# Patient Record
Sex: MALE | Race: WHITE | NOT HISPANIC OR LATINO | Employment: FULL TIME | URBAN - METROPOLITAN AREA
[De-identification: names, ages, dates, MRNs, and addresses within clinical notes are randomized per-mention and may not be internally consistent; named-entity substitution may affect disease eponyms.]

---

## 2019-06-20 ENCOUNTER — OFFICE VISIT (OUTPATIENT)
Dept: URGENT CARE | Facility: CLINIC | Age: 29
End: 2019-06-20
Payer: COMMERCIAL

## 2019-06-20 VITALS
BODY MASS INDEX: 30.92 KG/M2 | HEART RATE: 104 BPM | DIASTOLIC BLOOD PRESSURE: 72 MMHG | OXYGEN SATURATION: 100 % | WEIGHT: 216 LBS | TEMPERATURE: 103.7 F | SYSTOLIC BLOOD PRESSURE: 112 MMHG | HEIGHT: 70 IN | RESPIRATION RATE: 16 BRPM

## 2019-06-20 DIAGNOSIS — B34.9 VIRAL INFECTION: Primary | ICD-10-CM

## 2019-06-20 DIAGNOSIS — B35.4 TINEA CORPORIS: ICD-10-CM

## 2019-06-20 LAB — SL AMB POCT GLUCOSE BLD: 108

## 2019-06-20 PROCEDURE — 82948 REAGENT STRIP/BLOOD GLUCOSE: CPT | Performed by: FAMILY MEDICINE

## 2019-06-20 PROCEDURE — 99203 OFFICE O/P NEW LOW 30 MIN: CPT | Performed by: FAMILY MEDICINE

## 2019-06-21 ENCOUNTER — TELEPHONE (OUTPATIENT)
Dept: URGENT CARE | Facility: CLINIC | Age: 29
End: 2019-06-21

## 2019-06-21 NOTE — TELEPHONE ENCOUNTER
Call from pt stating  RX is not covered will need  Prior auth  Call placed to pharmacy they  said if ordered separately  RX will be  Covered spoke with Dr Minerva Park  Who will send in new RX  Ordered separately  Call placed to pt  To let them know new RX will be sent in  Call sent to Dr Minerva Park  To write new RX  RX will need to go to [facility] [place] AND [facility]

## 2019-06-22 ENCOUNTER — TELEPHONE (OUTPATIENT)
Dept: URGENT CARE | Facility: CLINIC | Age: 29
End: 2019-06-22

## 2019-06-22 DIAGNOSIS — B35.4 TINEA CORPORIS: Primary | ICD-10-CM

## 2019-06-22 RX ORDER — NYSTATIN 100000 U/G
CREAM TOPICAL 2 TIMES DAILY
Qty: 30 G | Refills: 0 | Status: SHIPPED | OUTPATIENT
Start: 2019-06-22 | End: 2020-01-20 | Stop reason: ALTCHOICE

## 2019-06-22 RX ORDER — TRIAMCINOLONE ACETONIDE 1 MG/G
CREAM TOPICAL 2 TIMES DAILY
Qty: 30 G | Refills: 0 | Status: SHIPPED | OUTPATIENT
Start: 2019-06-22 | End: 2020-01-20 | Stop reason: ALTCHOICE

## 2019-06-22 NOTE — TELEPHONE ENCOUNTER
Please see call from yesterday never done by Dr Quinton Lehman pt  Is now very upset  RX needs to be called in today

## 2019-07-03 ENCOUNTER — OFFICE VISIT (OUTPATIENT)
Dept: FAMILY MEDICINE CLINIC | Facility: CLINIC | Age: 29
End: 2019-07-03
Payer: COMMERCIAL

## 2019-07-03 VITALS
BODY MASS INDEX: 32.74 KG/M2 | SYSTOLIC BLOOD PRESSURE: 124 MMHG | DIASTOLIC BLOOD PRESSURE: 84 MMHG | HEART RATE: 91 BPM | HEIGHT: 68 IN | TEMPERATURE: 98.2 F | RESPIRATION RATE: 14 BRPM | WEIGHT: 216 LBS | OXYGEN SATURATION: 98 %

## 2019-07-03 DIAGNOSIS — Z76.89 ESTABLISHING CARE WITH NEW DOCTOR, ENCOUNTER FOR: Primary | ICD-10-CM

## 2019-07-03 DIAGNOSIS — A69.20 ERYTHEMA MIGRANS (LYME DISEASE): ICD-10-CM

## 2019-07-03 DIAGNOSIS — A69.20 LYME DISEASE: ICD-10-CM

## 2019-07-03 DIAGNOSIS — R21 RASH: ICD-10-CM

## 2019-07-03 PROCEDURE — 99202 OFFICE O/P NEW SF 15 MIN: CPT | Performed by: FAMILY MEDICINE

## 2019-07-03 RX ORDER — DOXYCYCLINE HYCLATE 100 MG/1
100 CAPSULE ORAL EVERY 12 HOURS SCHEDULED
Qty: 60 CAPSULE | Refills: 0 | Status: SHIPPED | OUTPATIENT
Start: 2019-07-03 | End: 2019-08-02

## 2019-07-03 RX ORDER — OMEGA-3 FATTY ACIDS/FISH OIL 300-1000MG
CAPSULE ORAL
COMMUNITY
End: 2020-01-20 | Stop reason: ALTCHOICE

## 2019-07-03 NOTE — PROGRESS NOTES
Assessment/Plan:    No problem-specific Assessment & Plan notes found for this encounter  Diagnoses and all orders for this visit:    Establishing care with new doctor, encounter for    Rash    Erythema migrans (Lyme disease)  -     doxycycline hyclate (VIBRAMYCIN) 100 mg capsule; Take 1 capsule (100 mg total) by mouth every 12 (twelve) hours for 30 days    Lyme disease    Other orders  -     Ibuprofen (ADVIL) 200 MG CAPS; Take by mouth 6 per day as needed for fever          Patient Instructions   PLENTY OF FLUIDS  REST  ADVIL  MEDICATION AS DIRECTED    RV IF SYMPTOMS WORSEN OR PERSIST    CONSIDER CPX IN THE FUTURE      Return in about 3 months (around 10/3/2019) for Annual physical     Subjective:      Patient ID: Moise Walker is a 33 y o  male  Chief Complaint   Patient presents with    [facility]    Lymes     seen at [facility], Intermittent Temps, Rash-several, stiff neck, H/A, Fatigue    Rash     multiple on Back-chest-legs-butt-arms       PATIENT IS A NEW PATIENT TO THE PRACTICE    REVIEWED MEDICAL RECORD    PATIENT STATES HE HAD HA, CHILLS  DEVELOPED A RASH WHICH SEEMS TO HAVE MULTIPLIED  NO NVD      The following portions of the patient's history were reviewed and updated as appropriate: allergies, current medications, past family history, past medical history, past social history, past surgical history and problem list     Review of Systems   Constitutional: Positive for chills, fatigue and fever  HENT: Negative for sore throat  Eyes: Negative for discharge  Respiratory: Negative for cough and chest tightness  Cardiovascular: Negative for chest pain and palpitations  Gastrointestinal: Negative for abdominal pain, diarrhea, nausea and vomiting  Musculoskeletal: Positive for arthralgias and myalgias  Negative for gait problem  Skin: Positive for rash  Neurological: Negative for dizziness, weakness and headaches  Hematological: Negative for adenopathy  Psychiatric/Behavioral: The patient is not nervous/anxious  Current Outpatient Medications   Medication Sig Dispense Refill    Ibuprofen (ADVIL) 200 MG CAPS Take by mouth 6 per day as needed for fever      doxycycline hyclate (VIBRAMYCIN) 100 mg capsule Take 1 capsule (100 mg total) by mouth every 12 (twelve) hours for 30 days 60 capsule 0    nystatin (MYCOSTATIN) cream Apply topically 2 (two) times a day (Patient not taking: Reported on 7/3/2019) 30 g 0    nystatin-triamcinolone (MYCOLOG-II) cream Apply topically 3 (three) times a day (Patient not taking: Reported on 7/3/2019) 30 g 0    triamcinolone (KENALOG) 0 1 % cream Apply topically 2 (two) times a day (Patient not taking: Reported on 7/3/2019) 30 g 0     No current facility-administered medications for this visit  Objective:    /84 (BP Location: Left arm, Patient Position: Sitting, Cuff Size: Standard)   Pulse 91   Temp 98 2 °F (36 8 °C) (Temporal)   Resp 14   Ht 5' 8" (1 727 m)   Wt 98 kg (216 lb)   SpO2 98%   BMI 32 84 kg/m²        Physical Exam   Constitutional: He is oriented to person, place, and time  He appears well-developed and well-nourished  HENT:   Head: Normocephalic and atraumatic  Eyes: Pupils are equal, round, and reactive to light  Conjunctivae and EOM are normal  Right eye exhibits no discharge  Left eye exhibits no discharge  Neck: Neck supple  No JVD present  No thyromegaly present  Cardiovascular: Normal rate, regular rhythm and normal heart sounds  No murmur heard  Pulmonary/Chest: Effort normal and breath sounds normal  He has no wheezes  He has no rales  Abdominal: Soft  Bowel sounds are normal  He exhibits no mass  There is no hepatosplenomegaly  There is no tenderness  There is no rebound, no guarding and no CVA tenderness  Musculoskeletal: Normal range of motion  He exhibits no edema, tenderness or deformity  Lymphadenopathy:     He has no cervical adenopathy       He has no axillary adenopathy  Neurological: He is alert and oriented to person, place, and time  Skin: Skin is warm and dry  Rash noted  No erythema  MULTIPLE TARGET LESIONS CONSISTENT WITH SECONDARY LYME'S DISEASE   Psychiatric: He has a normal mood and affect   His behavior is normal  Judgment and thought content normal               Francisco Bolanos MD

## 2019-07-03 NOTE — PATIENT INSTRUCTIONS
PLENTY OF FLUIDS  REST  ADVIL  MEDICATION AS DIRECTED    RV IF SYMPTOMS WORSEN OR PERSIST    CONSIDER CPX IN THE FUTURE

## 2020-01-08 ENCOUNTER — TELEPHONE (OUTPATIENT)
Dept: FAMILY MEDICINE CLINIC | Facility: CLINIC | Age: 30
End: 2020-01-08

## 2020-01-09 NOTE — TELEPHONE ENCOUNTER
Pend CPX blood work - [facility]   1-13-20 - HIV also    Verona Nicolas

## 2020-01-12 PROBLEM — Z13.29 SCREENING FOR HYPOTHYROIDISM: Status: ACTIVE | Noted: 2020-01-12

## 2020-01-12 PROBLEM — Z13.6 SCREENING FOR HYPERTENSION: Status: ACTIVE | Noted: 2020-01-12

## 2020-01-12 PROBLEM — Z13.220 SCREENING FOR HYPERLIPIDEMIA: Status: ACTIVE | Noted: 2020-01-12

## 2020-01-12 PROBLEM — Z00.00 ROUTINE GENERAL MEDICAL EXAMINATION AT A HEALTH CARE FACILITY: Status: ACTIVE | Noted: 2020-01-12

## 2020-01-13 ENCOUNTER — CLINICAL SUPPORT (OUTPATIENT)
Dept: FAMILY MEDICINE CLINIC | Facility: CLINIC | Age: 30
End: 2020-01-13
Payer: COMMERCIAL

## 2020-01-13 DIAGNOSIS — Z13.220 SCREENING FOR HYPERLIPIDEMIA: ICD-10-CM

## 2020-01-13 DIAGNOSIS — Z00.00 ROUTINE GENERAL MEDICAL EXAMINATION AT A HEALTH CARE FACILITY: Primary | ICD-10-CM

## 2020-01-13 DIAGNOSIS — Z13.6 SCREENING FOR HYPERTENSION: ICD-10-CM

## 2020-01-13 DIAGNOSIS — Z13.29 SCREENING FOR HYPOTHYROIDISM: ICD-10-CM

## 2020-01-13 PROCEDURE — 36415 COLL VENOUS BLD VENIPUNCTURE: CPT | Performed by: FAMILY MEDICINE

## 2020-01-14 LAB
ALBUMIN SERPL-MCNC: 5.2 G/DL (ref 3.5–5.5)
ALBUMIN/GLOB SERPL: 2.6 {RATIO} (ref 1.2–2.2)
ALP SERPL-CCNC: 63 IU/L (ref 39–117)
ALT SERPL-CCNC: 54 IU/L (ref 0–44)
AST SERPL-CCNC: 27 IU/L (ref 0–40)
BASOPHILS # BLD AUTO: 0.1 X10E3/UL (ref 0–0.2)
BASOPHILS NFR BLD AUTO: 1 %
BILIRUB SERPL-MCNC: 0.5 MG/DL (ref 0–1.2)
BUN SERPL-MCNC: 12 MG/DL (ref 6–20)
BUN/CREAT SERPL: 12 (ref 9–20)
CALCIUM SERPL-MCNC: 10.1 MG/DL (ref 8.7–10.2)
CHLORIDE SERPL-SCNC: 100 MMOL/L (ref 96–106)
CHOLEST SERPL-MCNC: 212 MG/DL (ref 100–199)
CHOLEST/HDLC SERPL: 4 RATIO (ref 0–5)
CO2 SERPL-SCNC: 24 MMOL/L (ref 20–29)
CREAT SERPL-MCNC: 1.04 MG/DL (ref 0.76–1.27)
EOSINOPHIL # BLD AUTO: 0.3 X10E3/UL (ref 0–0.4)
EOSINOPHIL NFR BLD AUTO: 3 %
ERYTHROCYTE [DISTWIDTH] IN BLOOD BY AUTOMATED COUNT: 13.4 % (ref 11.6–15.4)
GLOBULIN SER-MCNC: 2 G/DL (ref 1.5–4.5)
GLUCOSE SERPL-MCNC: 88 MG/DL (ref 65–99)
HCT VFR BLD AUTO: 49.2 % (ref 37.5–51)
HDLC SERPL-MCNC: 53 MG/DL
HGB BLD-MCNC: 17.3 G/DL (ref 13–17.7)
IMM GRANULOCYTES # BLD: 0 X10E3/UL (ref 0–0.1)
IMM GRANULOCYTES NFR BLD: 0 %
LDLC SERPL CALC-MCNC: 125 MG/DL (ref 0–99)
LYMPHOCYTES # BLD AUTO: 2.8 X10E3/UL (ref 0.7–3.1)
LYMPHOCYTES NFR BLD AUTO: 37 %
MCH RBC QN AUTO: 31.5 PG (ref 26.6–33)
MCHC RBC AUTO-ENTMCNC: 35.2 G/DL (ref 31.5–35.7)
MCV RBC AUTO: 90 FL (ref 79–97)
MONOCYTES # BLD AUTO: 0.6 X10E3/UL (ref 0.1–0.9)
MONOCYTES NFR BLD AUTO: 8 %
NEUTROPHILS # BLD AUTO: 4 X10E3/UL (ref 1.4–7)
NEUTROPHILS NFR BLD AUTO: 51 %
PLATELET # BLD AUTO: 251 X10E3/UL (ref 150–450)
POTASSIUM SERPL-SCNC: 4.3 MMOL/L (ref 3.5–5.2)
PROT SERPL-MCNC: 7.2 G/DL (ref 6–8.5)
RBC # BLD AUTO: 5.5 X10E6/UL (ref 4.14–5.8)
SL AMB EGFR AFRICAN AMERICAN: 111 ML/MIN/1.73
SL AMB EGFR NON AFRICAN AMERICAN: 96 ML/MIN/1.73
SL AMB VLDL CHOLESTEROL CALC: 34 MG/DL (ref 5–40)
SODIUM SERPL-SCNC: 143 MMOL/L (ref 134–144)
TRIGL SERPL-MCNC: 169 MG/DL (ref 0–149)
TSH SERPL DL<=0.005 MIU/L-ACNC: 1.46 UIU/ML (ref 0.45–4.5)
WBC # BLD AUTO: 7.7 X10E3/UL (ref 3.4–10.8)

## 2020-01-20 ENCOUNTER — OFFICE VISIT (OUTPATIENT)
Dept: FAMILY MEDICINE CLINIC | Facility: CLINIC | Age: 30
End: 2020-01-20
Payer: COMMERCIAL

## 2020-01-20 VITALS
HEIGHT: 70 IN | DIASTOLIC BLOOD PRESSURE: 80 MMHG | TEMPERATURE: 97.5 F | RESPIRATION RATE: 14 BRPM | BODY MASS INDEX: 32.67 KG/M2 | HEART RATE: 73 BPM | OXYGEN SATURATION: 100 % | WEIGHT: 228.2 LBS | SYSTOLIC BLOOD PRESSURE: 110 MMHG

## 2020-01-20 DIAGNOSIS — Z13.220 SCREENING FOR HYPERLIPIDEMIA: ICD-10-CM

## 2020-01-20 DIAGNOSIS — Z13.29 SCREENING FOR HYPOTHYROIDISM: ICD-10-CM

## 2020-01-20 DIAGNOSIS — Z00.00 ROUTINE GENERAL MEDICAL EXAMINATION AT A HEALTH CARE FACILITY: Primary | ICD-10-CM

## 2020-01-20 DIAGNOSIS — Z13.6 SCREENING FOR HYPERTENSION: ICD-10-CM

## 2020-01-20 DIAGNOSIS — Z23 NEED FOR TDAP VACCINATION: ICD-10-CM

## 2020-01-20 PROBLEM — A69.20 ERYTHEMA MIGRANS (LYME DISEASE): Status: RESOLVED | Noted: 2019-07-03 | Resolved: 2020-01-20

## 2020-01-20 PROBLEM — R21 RASH: Status: RESOLVED | Noted: 2019-07-03 | Resolved: 2020-01-20

## 2020-01-20 PROBLEM — Z76.89 ESTABLISHING CARE WITH NEW DOCTOR, ENCOUNTER FOR: Status: RESOLVED | Noted: 2019-07-03 | Resolved: 2020-01-20

## 2020-01-20 PROCEDURE — 93000 ELECTROCARDIOGRAM COMPLETE: CPT | Performed by: FAMILY MEDICINE

## 2020-01-20 PROCEDURE — 90471 IMMUNIZATION ADMIN: CPT

## 2020-01-20 PROCEDURE — 99395 PREV VISIT EST AGE 18-39: CPT | Performed by: FAMILY MEDICINE

## 2020-01-20 PROCEDURE — 90715 TDAP VACCINE 7 YRS/> IM: CPT

## 2020-01-20 NOTE — LETTER
VY SHAHEEN    Recent Results (from the past 672 hour(s))   TSH, 3rd generation    Collection Time: 01/13/20 10:01 AM   Result Value Ref Range    TSH 1 460 0 450 - 4 500 uIU/mL   CBC and differential    Collection Time: 01/13/20 10:01 AM   Result Value Ref Range    White Blood Cell Count 7 7 3 4 - 10 8 x10E3/uL    Red Blood Cell Count 5 50 4  14 - 5 80 x10E6/uL    Hemoglobin 17 3 13 0 - 17 7 g/dL    HCT 49 2 37 5 - 51 0 %    MCV 90 79 - 97 fL    MCH 31 5 26 6 - 33 0 pg    MCHC 35 2 31 5 - 35 7 g/dL    RDW 13 4 11 6 - 15 4 %    Platelet Count 361 080 - 450 x10E3/uL    Neutrophils 51 Not Estab  %    Lymphocytes 37 Not Estab  %    Monocytes 8 Not Estab  %    Eosinophils 3 Not Estab  %    Basophils PCT 1 Not Estab  %    Neutrophils (Absolute) 4 0 1 4 - 7 0 x10E3/uL    Lymphocytes (Absolute) 2 8 0 7 - 3 1 x10E3/uL    Monocytes (Absolute) 0 6 0 1 - 0 9 x10E3/uL    Eosinophils (Absolute) 0 3 0 0 - 0 4 x10E3/uL    Basophils ABS 0 1 0 0 - 0 2 x10E3/uL    Immature Granulocytes 0 Not Estab  %    Immature Granulocytes (Absolute) 0 0 0 0 - 0 1 x10E3/uL   Comprehensive metabolic panel    Collection Time: 01/13/20 10:01 AM   Result Value Ref Range    Glucose, Random 88 65 - 99 mg/dL    BUN 12 6 - 20 mg/dL    Creatinine 1 04 0 76 - 1 27 mg/dL    eGFR Non  96 >59 mL/min/1 73    eGFR  111 >59 mL/min/1 73    SL AMB BUN/CREATININE RATIO 12 9 - 20    Sodium 143 134 - 144 mmol/L    Potassium 4 3 3 5 - 5 2 mmol/L    Chloride 100 96 - 106 mmol/L    CO2 24 20 - 29 mmol/L    CALCIUM 10 1 8 7 - 10 2 mg/dL    Protein, Total 7 2 6 0 - 8 5 g/dL    Albumin 5 2 3 5 - 5 5 g/dL    Globulin, Total 2 0 1 5 - 4 5 g/dL    Albumin/Globulin Ratio 2 6 (H) 1 2 - 2 2    TOTAL BILIRUBIN 0 5 0 0 - 1 2 mg/dL    Alk Phos Isoenzymes 63 39 - 117 IU/L    AST 27 0 - 40 IU/L    ALT 54 (H) 0 - 44 IU/L   Lipid panel    Collection Time: 01/13/20 10:01 AM   Result Value Ref Range    Cholesterol, Total 212 (H) 100 - 199 mg/dL Triglycerides 169 (H) 0 - 149 mg/dL    HDL 53 >39 mg/dL    VLDL Cholesterol Calculated 34 5 - 40 mg/dL    LDL Direct 125 (H) 0 - 99 mg/dL    T   Chol/HDL Ratio 4 0 0 0 - 5 0 ratio

## 2020-01-20 NOTE — PATIENT INSTRUCTIONS
DISCUSSED HEALTH MAINTENENCE ISSUES  BW WILL BE REVIEWED  ENCOURAGED HEALTHY DIET AND EXERCISE  RV FOR ANNUAL HEALTH EXAM IN 1 YEAR  RV SOONER IF THERE ARE ANY CONCERNS    Recent Results (from the past 672 hour(s))   TSH, 3rd generation    Collection Time: 01/13/20 10:01 AM   Result Value Ref Range    TSH 1 460 0 450 - 4 500 uIU/mL   CBC and differential    Collection Time: 01/13/20 10:01 AM   Result Value Ref Range    White Blood Cell Count 7 7 3 4 - 10 8 x10E3/uL    Red Blood Cell Count 5 50 4  14 - 5 80 x10E6/uL    Hemoglobin 17 3 13 0 - 17 7 g/dL    HCT 49 2 37 5 - 51 0 %    MCV 90 79 - 97 fL    MCH 31 5 26 6 - 33 0 pg    MCHC 35 2 31 5 - 35 7 g/dL    RDW 13 4 11 6 - 15 4 %    Platelet Count 883 417 - 450 x10E3/uL    Neutrophils 51 Not Estab  %    Lymphocytes 37 Not Estab  %    Monocytes 8 Not Estab  %    Eosinophils 3 Not Estab  %    Basophils PCT 1 Not Estab  %    Neutrophils (Absolute) 4 0 1 4 - 7 0 x10E3/uL    Lymphocytes (Absolute) 2 8 0 7 - 3 1 x10E3/uL    Monocytes (Absolute) 0 6 0 1 - 0 9 x10E3/uL    Eosinophils (Absolute) 0 3 0 0 - 0 4 x10E3/uL    Basophils ABS 0 1 0 0 - 0 2 x10E3/uL    Immature Granulocytes 0 Not Estab  %    Immature Granulocytes (Absolute) 0 0 0 0 - 0 1 x10E3/uL   Comprehensive metabolic panel    Collection Time: 01/13/20 10:01 AM   Result Value Ref Range    Glucose, Random 88 65 - 99 mg/dL    BUN 12 6 - 20 mg/dL    Creatinine 1 04 0 76 - 1 27 mg/dL    eGFR Non  96 >59 mL/min/1 73    eGFR  111 >59 mL/min/1 73    SL AMB BUN/CREATININE RATIO 12 9 - 20    Sodium 143 134 - 144 mmol/L    Potassium 4 3 3 5 - 5 2 mmol/L    Chloride 100 96 - 106 mmol/L    CO2 24 20 - 29 mmol/L    CALCIUM 10 1 8 7 - 10 2 mg/dL    Protein, Total 7 2 6 0 - 8 5 g/dL    Albumin 5 2 3 5 - 5 5 g/dL    Globulin, Total 2 0 1 5 - 4 5 g/dL    Albumin/Globulin Ratio 2 6 (H) 1 2 - 2 2    TOTAL BILIRUBIN 0 5 0 0 - 1 2 mg/dL    Alk Phos Isoenzymes 63 39 - 117 IU/L    AST 27 0 - 40 IU/L    ALT 54 (H) 0 - 44 IU/L   Lipid panel    Collection Time: 01/13/20 10:01 AM   Result Value Ref Range    Cholesterol, Total 212 (H) 100 - 199 mg/dL    Triglycerides 169 (H) 0 - 149 mg/dL    HDL 53 >39 mg/dL    VLDL Cholesterol Calculated 34 5 - 40 mg/dL    LDL Direct 125 (H) 0 - 99 mg/dL    T   Chol/HDL Ratio 4 0 0 0 - 5 0 ratio

## 2020-01-20 NOTE — PROGRESS NOTES
BMI Counseling: Body mass index is 33 22 kg/m²  The BMI is above normal  Nutrition recommendations include encouraging healthy choices of fruits and vegetables and moderation in carbohydrate intake  Exercise recommendations include exercising 3-5 times per week  No pharmacotherapy was ordered  FAMILY PRACTICE HEALTH MAINTENANCE OFFICE VISIT  [facility] - [facility] [address] [facility]    NAME: Dora Jha  AGE: 26 y o  SEX: male  : 1990     DATE: 2020    Assessment and Plan     Problem List Items Addressed This Visit        Other    Screening for hypothyroidism    Screening for hyperlipidemia    Screening for hypertension    Relevant Orders    POCT urine dip auto non-scope    POCT ECG (Completed)    Routine general medical examination at a health care facility - Primary    Relevant Orders    POCT urine dip auto non-scope    POCT ECG (Completed)    Need for Tdap vaccination    Relevant Orders    TDAP VACCINE GREATER THAN OR EQUAL TO 8YO IM               Return in about 1 year (around 2021) for Annual physical         Chief Complaint     Chief Complaint   Patient presents with    Annual Exam    Immunizations     would like to discuss Tdap       History of Present Illness     [address] RECORD  NO CONCERNS AT THIS TIME        Well Adult Physical   Patient here for a comprehensive physical exam       Diet and Physical Activity  Diet: well balanced diet  Weight concerns: Patient has class 1 obesity (BMI 30-34  9)  Exercise: infrequently      Depression Screen  PHQ-9 Depression Screening    PHQ-9:    Frequency of the following problems over the past two weeks:       Little interest or pleasure in doing things:  0 - not at all  Feeling down, depressed, or hopeless:  0 - not at all  PHQ-2 Score:  0          General Health  Hearing: Normal:  bilateral  Vision: no vision problems  Dental: regular dental visits    Reproductive Health          The following portions of the patient's history were reviewed and updated as appropriate: allergies, current medications, past family history, past medical history, past social history, past surgical history and problem list     Review of Systems     Review of Systems   Constitutional: Negative for chills, fatigue and fever  HENT: Negative for congestion, ear discharge, ear pain, mouth sores, postnasal drip, sore throat and trouble swallowing  Eyes: Negative for pain, discharge and visual disturbance  Respiratory: Negative for cough, shortness of breath and wheezing  Cardiovascular: Negative for chest pain, palpitations and leg swelling  Gastrointestinal: Negative for abdominal distention, abdominal pain, blood in stool, diarrhea and nausea  Endocrine: Negative for polydipsia, polyphagia and polyuria  Genitourinary: Negative for dysuria, frequency, hematuria and urgency  Musculoskeletal: Negative for arthralgias, gait problem and joint swelling  Skin: Negative for pallor and rash  Neurological: Negative for dizziness, syncope, speech difficulty, weakness, light-headedness, numbness and headaches  Hematological: Negative for adenopathy  Psychiatric/Behavioral: Negative for behavioral problems, confusion and sleep disturbance  The patient is not nervous/anxious          Past Medical History     Past Medical History:   Diagnosis Date    Erb's palsy since birth   Venkat Mehdi Hernia of abdominal wall 12/2018    Hypoglycemic disorder     Hypoglycemic disorder        Past Surgical History     Past Surgical History:   Procedure Laterality Date    HERNIA REPAIR         Social History     Social History     Socioeconomic History    Marital status: /Civil Union     Spouse name: None    Number of children: None    Years of education: None    Highest education level: None   Occupational History    None   Social Needs    Financial resource strain: None    Food insecurity:     Worry: None Inability: None    Transportation needs:     Medical: None     Non-medical: None   Tobacco Use    Smoking status: Former Smoker     Last attempt to quit: 2018     Years since quittin 1    Smokeless tobacco: Never Used   Substance and Sexual Activity    Alcohol use: Yes     Alcohol/week: 4 0 standard drinks     Types: 4 Cans of beer per week     Frequency: 2-4 times a month     Drinks per session: 3 or 4     Binge frequency: Less than monthly    Drug use: Yes     Types: Marijuana     Comment: OCCASSIONALY    Sexual activity: Yes     Partners: Female     Birth control/protection: None   Lifestyle    Physical activity:     Days per week: None     Minutes per session: None    Stress: None   Relationships    Social connections:     Talks on phone: None     Gets together: None     Attends [employer] service: None     Active member of club or organization: None     Attends meetings of clubs or organizations: None     Relationship status: None    Intimate partner violence:     Fear of current or ex partner: None     Emotionally abused: None     Physically abused: None     Forced sexual activity: None   Other Topics Concern    None   Social History Narrative    None       Family History     Family History   Problem Relation Age of Onset    Diabetes Mother     Hypertension Mother     No Known Problems Father     No Known Problems Sister     No Known Problems Brother     Substance Abuse Neg Hx     Mental illness Neg Hx        Current Medications     No current outpatient medications on file  Allergies     Allergies   Allergen Reactions    Penicillin G Benzathine Anaphylaxis    Other      LIVE VACCINES - WILL GET THE DZ       Objective     /80   Pulse 73   Temp 97 5 °F (36 4 °C) (Temporal)   Resp 14   Ht 5' 9 5" (1 765 m)   Wt 104 kg (228 lb 3 2 oz)   SpO2 100%   BMI 33 22 kg/m²      Physical Exam   Constitutional: He is oriented to person, place, and time   He appears well-developed and well-nourished  HENT:   Head: Normocephalic and atraumatic  Right Ear: External ear normal    Left Ear: External ear normal    Nose: Nose normal    Mouth/Throat: Oropharynx is clear and moist    Eyes: Pupils are equal, round, and reactive to light  Conjunctivae and EOM are normal  Right eye exhibits no discharge  Left eye exhibits no discharge  Neck: Neck supple  No JVD present  No thyromegaly present  Cardiovascular: Normal rate, regular rhythm, normal heart sounds and intact distal pulses  No murmur heard  Pulmonary/Chest: Effort normal and breath sounds normal  He has no wheezes  He has no rales  Abdominal: Soft  Bowel sounds are normal  He exhibits no mass  There is no hepatosplenomegaly  There is no tenderness  There is no rebound, no guarding and no CVA tenderness  Genitourinary: Penis normal    Musculoskeletal: Normal range of motion  He exhibits no edema, tenderness or deformity  Lymphadenopathy:     He has no cervical adenopathy  He has no axillary adenopathy  Neurological: He is alert and oriented to person, place, and time  He has normal reflexes  No cranial nerve deficit  He exhibits normal muscle tone  Coordination normal    Skin: Skin is warm and dry  No rash noted  No erythema  Psychiatric: He has a normal mood and affect   His behavior is normal  Judgment and thought content normal          No exam data present    Health Maintenance     Health Maintenance   Topic Date Due    DTaP,Tdap,and Td Vaccines (1 - Tdap) 11/29/2000    HIV Screening  11/29/2004    BMI: Followup Plan  11/29/2007    Annual Physical  11/29/2007    Influenza Vaccine  02/16/2020 (Originally 7/1/2019)    Depression Screening PHQ  07/03/2020    BMI: Adult  07/03/2020    Pneumococcal Vaccine: 65+ Years (1 of 2 - PCV13) 11/29/2054    Pneumococcal Vaccine: Pediatrics (0 to 5 Years) and At-Risk Patients (6 to 59 Years)  Aged Out    HIB Vaccine  Aged Out    Hepatitis B Vaccine  Aged Out    IPV Vaccine  Aged Out    Hepatitis A Vaccine  Aged Out    Meningococcal ACWY Vaccine  Aged Out    HPV Vaccine  Aged Out     There is no immunization history for the selected administration types on file for this patient      Kassandra Noriega MD  [facility]

## 2020-08-07 ENCOUNTER — OFFICE VISIT (OUTPATIENT)
Dept: URGENT CARE | Facility: CLINIC | Age: 30
End: 2020-08-07
Payer: COMMERCIAL

## 2020-08-07 VITALS
BODY MASS INDEX: 30.07 KG/M2 | HEIGHT: 71 IN | TEMPERATURE: 98.3 F | OXYGEN SATURATION: 100 % | RESPIRATION RATE: 18 BRPM | SYSTOLIC BLOOD PRESSURE: 126 MMHG | DIASTOLIC BLOOD PRESSURE: 72 MMHG | HEART RATE: 82 BPM | WEIGHT: 214.8 LBS

## 2020-08-07 DIAGNOSIS — H60.311 ACUTE DIFFUSE OTITIS EXTERNA OF RIGHT EAR: Primary | ICD-10-CM

## 2020-08-07 PROCEDURE — 1036F TOBACCO NON-USER: CPT | Performed by: FAMILY MEDICINE

## 2020-08-07 PROCEDURE — 99213 OFFICE O/P EST LOW 20 MIN: CPT | Performed by: FAMILY MEDICINE

## 2020-08-07 PROCEDURE — 3008F BODY MASS INDEX DOCD: CPT | Performed by: FAMILY MEDICINE

## 2020-08-07 RX ORDER — CIPROFLOXACIN AND DEXAMETHASONE 3; 1 MG/ML; MG/ML
4 SUSPENSION/ DROPS AURICULAR (OTIC) 2 TIMES DAILY
Qty: 7.5 ML | Refills: 0 | Status: SHIPPED | OUTPATIENT
Start: 2020-08-07 | End: 2020-08-14

## 2020-08-07 NOTE — PROGRESS NOTES
[facility]        NAME: Fanta Maria is a 26 y o  male  : 1990    MRN: [id number]  DATE: 2020  TIME: 3:41 PM    Assessment and Plan   Acute diffuse otitis externa of right ear [H60 311]  1  Acute diffuse otitis externa of right ear  ciprofloxacin-dexamethasone (CIPRODEX) otic suspension     Ear wick inserted into the right ear canal   Prescribed 1 week of Ciprodex  Patient Instructions     Follow up with PCP in 3-5 days  Proceed to  ER if symptoms worsen  Chief Complaint     Chief Complaint   Patient presents with    Earache     Pt here for  right ear pain  pt states has swimmers ear x 2 weeks not getting better used OTC meds not helping,  Pain 6/10 , no fever  History of Present Illness     23-year-old male presents today due to right otalgia which has persisted over the past 2 weeks  Does recall that it improved and then worsened over the past 2 days  Denies any dizziness or nasal symptoms outside of his seasonal allergies  Tried mineral oil ear drops to no avail  Denies any obvious fever, chills or any obvious sick contacts  Review of Systems   Review of Systems   Constitutional: Negative for chills and fever  HENT: Positive for ear pain and rhinorrhea  Negative for congestion and ear discharge  Respiratory: Negative for cough and shortness of breath  Cardiovascular: Negative for chest pain  Gastrointestinal: Negative for abdominal pain and nausea  Allergic/Immunologic: Positive for environmental allergies  Neurological: Negative for dizziness and headaches           Current Medications       Current Outpatient Medications:     ciprofloxacin-dexamethasone (CIPRODEX) otic suspension, Administer 4 drops to the right ear 2 (two) times a day for 7 days, Disp: 7 5 mL, Rfl: 0    Current Allergies     Allergies as of 2020 - Reviewed 2020   Allergen Reaction Noted    Other  2019    Penicillin g benzathine Anaphylaxis 2019 The following portions of the patient's history were reviewed and updated as appropriate: allergies, current medications, past family history, past medical history, past social history, past surgical history and problem list      Past Medical History:   Diagnosis Date    Erb's palsy since birth   Fazal Boyle Erb's palsy     Hernia of abdominal wall 12/2018    Hypoglycemic disorder     Hypoglycemic disorder        Past Surgical History:   Procedure Laterality Date    HERNIA REPAIR         Family History   Problem Relation Age of Onset    Diabetes Mother     Hypertension Mother     No Known Problems Father     No Known Problems Sister     No Known Problems Brother     Substance Abuse Neg Hx     Mental illness Neg Hx          Medications have been verified  Objective   /72 (BP Location: Right arm, Patient Position: Sitting, Cuff Size: Standard)   Pulse 82   Temp 98 3 °F (36 8 °C) (Tympanic)   Resp 18   Ht 5' 10 5" (1 791 m)   Wt 97 4 kg (214 lb 12 8 oz)   SpO2 100%   BMI 30 38 kg/m²        Physical Exam     Physical Exam  Vitals signs and nursing note reviewed  Constitutional:       General: He is not in acute distress  Appearance: Normal appearance  He is normal weight  He is not ill-appearing or toxic-appearing  HENT:      Head: Normocephalic and atraumatic  Right Ear: External ear normal  There is no impacted cerumen  Left Ear: Tympanic membrane, ear canal and external ear normal  There is no impacted cerumen  Ears:      Comments: Right ear canal swollen  Unable to visualize TM  Eyes:      General:         Right eye: No discharge  Left eye: No discharge  Conjunctiva/sclera: Conjunctivae normal    Pulmonary:      Effort: Pulmonary effort is normal  No respiratory distress  Breath sounds: No stridor  No wheezing or rhonchi  Skin:     General: Skin is warm  Findings: No erythema  Neurological:      General: No focal deficit present  Mental Status: He is alert and oriented to person, place, and time  Psychiatric:         Mood and Affect: Mood normal          Behavior: Behavior normal          Thought Content:  Thought content normal          Judgment: Judgment normal

## 2020-11-06 DIAGNOSIS — Z13.6 SCREENING FOR HYPERTENSION: ICD-10-CM

## 2020-11-06 DIAGNOSIS — Z00.00 ROUTINE GENERAL MEDICAL EXAMINATION AT A HEALTH CARE FACILITY: Primary | ICD-10-CM

## 2020-11-06 DIAGNOSIS — Z13.29 SCREENING FOR HYPOTHYROIDISM: ICD-10-CM

## 2020-11-06 DIAGNOSIS — Z11.4 SCREENING FOR HIV (HUMAN IMMUNODEFICIENCY VIRUS): ICD-10-CM

## 2020-11-06 DIAGNOSIS — Z13.220 SCREENING FOR HYPERLIPIDEMIA: ICD-10-CM

## 2020-12-02 ENCOUNTER — TELEMEDICINE (OUTPATIENT)
Dept: FAMILY MEDICINE CLINIC | Facility: CLINIC | Age: 30
End: 2020-12-02
Payer: COMMERCIAL

## 2020-12-02 VITALS — BODY MASS INDEX: 29.96 KG/M2 | WEIGHT: 214 LBS | TEMPERATURE: 99 F | HEIGHT: 71 IN

## 2020-12-02 DIAGNOSIS — R51.9 ACUTE NONINTRACTABLE HEADACHE, UNSPECIFIED HEADACHE TYPE: ICD-10-CM

## 2020-12-02 DIAGNOSIS — R05.9 COUGH: ICD-10-CM

## 2020-12-02 DIAGNOSIS — Z20.822 CLOSE EXPOSURE TO COVID-19 VIRUS: Primary | ICD-10-CM

## 2020-12-02 DIAGNOSIS — Z20.822 CLOSE EXPOSURE TO COVID-19 VIRUS: ICD-10-CM

## 2020-12-02 PROCEDURE — 99214 OFFICE O/P EST MOD 30 MIN: CPT | Performed by: NURSE PRACTITIONER

## 2020-12-02 PROCEDURE — U0003 INFECTIOUS AGENT DETECTION BY NUCLEIC ACID (DNA OR RNA); SEVERE ACUTE RESPIRATORY SYNDROME CORONAVIRUS 2 (SARS-COV-2) (CORONAVIRUS DISEASE [COVID-19]), AMPLIFIED PROBE TECHNIQUE, MAKING USE OF HIGH THROUGHPUT TECHNOLOGIES AS DESCRIBED BY CMS-2020-01-R: HCPCS | Performed by: NURSE PRACTITIONER

## 2020-12-02 PROCEDURE — 3008F BODY MASS INDEX DOCD: CPT | Performed by: NURSE PRACTITIONER

## 2020-12-02 PROCEDURE — 3725F SCREEN DEPRESSION PERFORMED: CPT | Performed by: NURSE PRACTITIONER

## 2020-12-02 RX ORDER — BIOTIN 1 MG
TABLET ORAL DAILY
COMMUNITY

## 2020-12-04 ENCOUNTER — TELEPHONE (OUTPATIENT)
Dept: FAMILY MEDICINE CLINIC | Facility: CLINIC | Age: 30
End: 2020-12-04

## 2020-12-04 ENCOUNTER — TELEMEDICINE (OUTPATIENT)
Dept: FAMILY MEDICINE CLINIC | Facility: CLINIC | Age: 30
End: 2020-12-04
Payer: COMMERCIAL

## 2020-12-04 VITALS — TEMPERATURE: 100.3 F

## 2020-12-04 DIAGNOSIS — U07.1 COVID-19 VIRUS INFECTION: Primary | ICD-10-CM

## 2020-12-04 DIAGNOSIS — Z87.891 HISTORY OF SMOKING: ICD-10-CM

## 2020-12-04 DIAGNOSIS — R07.89 CHEST TIGHTNESS: ICD-10-CM

## 2020-12-04 LAB — SARS-COV-2 RNA SPEC QL NAA+PROBE: DETECTED

## 2020-12-04 PROCEDURE — 1036F TOBACCO NON-USER: CPT | Performed by: NURSE PRACTITIONER

## 2020-12-04 PROCEDURE — 99213 OFFICE O/P EST LOW 20 MIN: CPT | Performed by: NURSE PRACTITIONER

## 2020-12-04 RX ORDER — ALBUTEROL SULFATE 90 UG/1
2 AEROSOL, METERED RESPIRATORY (INHALATION) EVERY 6 HOURS PRN
Qty: 1 INHALER | Refills: 5 | Status: SHIPPED | OUTPATIENT
Start: 2020-12-04

## 2020-12-04 NOTE — TELEPHONE ENCOUNTER
Please schedule follow for next week  I sent a message for his wife as well, you may double book them      Rae Hogan [address] [place] [address]

## 2020-12-04 NOTE — TELEPHONE ENCOUNTER
Spoke to Sunday and she does not want to schedule anything yet  She stated that right now she does want to pay co-pays  They feel ok right now and will call you next week if they are having any issues

## 2020-12-04 NOTE — TELEPHONE ENCOUNTER
Taylor left a message for Kenzie, for them to [facility] schedule a virtual visit with Marc Rowley next week

## 2021-02-08 LAB
ALBUMIN SERPL-MCNC: 4.7 G/DL (ref 3.6–5.1)
ALBUMIN/GLOB SERPL: 2 (CALC) (ref 1–2.5)
ALP SERPL-CCNC: 52 U/L (ref 36–130)
ALT SERPL-CCNC: 43 U/L (ref 9–46)
AST SERPL-CCNC: 21 U/L (ref 10–40)
BASOPHILS # BLD AUTO: 90 CELLS/UL (ref 0–200)
BASOPHILS NFR BLD AUTO: 1.4 %
BILIRUB SERPL-MCNC: 0.4 MG/DL (ref 0.2–1.2)
BUN SERPL-MCNC: 15 MG/DL (ref 7–25)
BUN/CREAT SERPL: NORMAL (CALC) (ref 6–22)
CALCIUM SERPL-MCNC: 9.6 MG/DL (ref 8.6–10.3)
CHLORIDE SERPL-SCNC: 107 MMOL/L (ref 98–110)
CHOLEST SERPL-MCNC: 222 MG/DL
CHOLEST/HDLC SERPL: 4.5 (CALC)
CO2 SERPL-SCNC: 29 MMOL/L (ref 20–32)
CREAT SERPL-MCNC: 0.86 MG/DL (ref 0.6–1.35)
EOSINOPHIL # BLD AUTO: 250 CELLS/UL (ref 15–500)
EOSINOPHIL NFR BLD AUTO: 3.9 %
ERYTHROCYTE [DISTWIDTH] IN BLOOD BY AUTOMATED COUNT: 12.7 % (ref 11–15)
GLOBULIN SER CALC-MCNC: 2.3 G/DL (CALC) (ref 1.9–3.7)
GLUCOSE SERPL-MCNC: 89 MG/DL (ref 65–99)
HCT VFR BLD AUTO: 48.5 % (ref 38.5–50)
HDLC SERPL-MCNC: 49 MG/DL
HGB BLD-MCNC: 16.7 G/DL (ref 13.2–17.1)
HIV 1+2 AB+HIV1 P24 AG SERPL QL IA: NORMAL
LDLC SERPL CALC-MCNC: 152 MG/DL (CALC)
LYMPHOCYTES # BLD AUTO: 2483 CELLS/UL (ref 850–3900)
LYMPHOCYTES NFR BLD AUTO: 38.8 %
MCH RBC QN AUTO: 31.5 PG (ref 27–33)
MCHC RBC AUTO-ENTMCNC: 34.4 G/DL (ref 32–36)
MCV RBC AUTO: 91.5 FL (ref 80–100)
MONOCYTES # BLD AUTO: 570 CELLS/UL (ref 200–950)
MONOCYTES NFR BLD AUTO: 8.9 %
NEUTROPHILS # BLD AUTO: 3008 CELLS/UL (ref 1500–7800)
NEUTROPHILS NFR BLD AUTO: 47 %
NONHDLC SERPL-MCNC: 173 MG/DL (CALC)
PLATELET # BLD AUTO: 249 THOUSAND/UL (ref 140–400)
PMV BLD REES-ECKER: 10.8 FL (ref 7.5–12.5)
POTASSIUM SERPL-SCNC: 4.4 MMOL/L (ref 3.5–5.3)
PROT SERPL-MCNC: 7 G/DL (ref 6.1–8.1)
RBC # BLD AUTO: 5.3 MILLION/UL (ref 4.2–5.8)
SL AMB EGFR AFRICAN AMERICAN: 134 ML/MIN/1.73M2
SL AMB EGFR NON AFRICAN AMERICAN: 116 ML/MIN/1.73M2
SODIUM SERPL-SCNC: 142 MMOL/L (ref 135–146)
TRIGL SERPL-MCNC: 99 MG/DL
TSH SERPL-ACNC: 1.09 MIU/L (ref 0.4–4.5)
WBC # BLD AUTO: 6.4 THOUSAND/UL (ref 3.8–10.8)

## 2021-02-12 ENCOUNTER — OFFICE VISIT (OUTPATIENT)
Dept: FAMILY MEDICINE CLINIC | Facility: CLINIC | Age: 31
End: 2021-02-12
Payer: COMMERCIAL

## 2021-02-12 VITALS
SYSTOLIC BLOOD PRESSURE: 124 MMHG | DIASTOLIC BLOOD PRESSURE: 80 MMHG | TEMPERATURE: 98.5 F | HEART RATE: 80 BPM | WEIGHT: 220 LBS | RESPIRATION RATE: 18 BRPM | BODY MASS INDEX: 31.5 KG/M2 | OXYGEN SATURATION: 98 % | HEIGHT: 70 IN

## 2021-02-12 DIAGNOSIS — Z00.00 ROUTINE GENERAL MEDICAL EXAMINATION AT A HEALTH CARE FACILITY: Primary | ICD-10-CM

## 2021-02-12 DIAGNOSIS — Z13.6 SCREENING FOR HYPERTENSION: ICD-10-CM

## 2021-02-12 DIAGNOSIS — Z13.220 SCREENING FOR HYPERLIPIDEMIA: ICD-10-CM

## 2021-02-12 DIAGNOSIS — Z13.29 SCREENING FOR HYPOTHYROIDISM: ICD-10-CM

## 2021-02-12 LAB
ECG INTERP DURING EX: NORMAL MS
SL AMB  POCT GLUCOSE, UA: 0
SL AMB LEUKOCYTE ESTERASE,UA: 0
SL AMB POCT BILIRUBIN,UA: 0
SL AMB POCT BLOOD,UA: 0
SL AMB POCT CLARITY,UA: CLEAR
SL AMB POCT COLOR,UA: YELLOW
SL AMB POCT KETONES,UA: 0
SL AMB POCT NITRITE,UA: 0
SL AMB POCT PH,UA: 6
SL AMB POCT SPECIFIC GRAVITY,UA: 1.02
SL AMB POCT URINE PROTEIN: 0
SL AMB POCT UROBILINOGEN: 0

## 2021-02-12 PROCEDURE — 81003 URINALYSIS AUTO W/O SCOPE: CPT | Performed by: FAMILY MEDICINE

## 2021-02-12 PROCEDURE — 3008F BODY MASS INDEX DOCD: CPT | Performed by: FAMILY MEDICINE

## 2021-02-12 PROCEDURE — 99395 PREV VISIT EST AGE 18-39: CPT | Performed by: FAMILY MEDICINE

## 2021-02-12 PROCEDURE — 93000 ELECTROCARDIOGRAM COMPLETE: CPT | Performed by: FAMILY MEDICINE

## 2021-02-12 PROCEDURE — 1036F TOBACCO NON-USER: CPT | Performed by: FAMILY MEDICINE

## 2021-02-12 NOTE — LETTER
[facility]      Current Outpatient Medications:     albuterol (PROVENTIL HFA,VENTOLIN HFA) 90 mcg/act inhaler, Inhale 2 puffs every 6 (six) hours as needed for wheezing or shortness of breath, Disp: 1 Inhaler, Rfl: 5    CALCIUM-MAGNESIUM-ZINC PO, Take by mouth daily, Disp: , Rfl:     Cholecalciferol (Vitamin D3) 25 MCG (1000 UT) CAPS, Take by mouth daily, Disp: , Rfl:     ciprofloxacin-dexamethasone (CIPRODEX) otic suspension, Administer 4 drops to the right ear 2 (two) times a day for 7 days (Patient not taking: Reported on 12/2/2020), Disp: 7 5 mL, Rfl: 0      Recent Results (from the past 672 hour(s))   Lipid panel    Collection Time: 02/06/21  9:29 AM   Result Value Ref Range    Total Cholesterol 222 (H) <200 mg/dL    HDL 49 > OR = 40 mg/dL    Triglycerides 99 <150 mg/dL    LDL Calculated 152 (H) mg/dL (calc)    Chol HDLC Ratio 4 5 <5 0 (calc)    Non-HDL Cholesterol 173 (H) <130 mg/dL (calc)   Human Immunodeficiency Virus 1/2 Antigen / Antibody ( Fourth Generation) with Reflex Testing    Collection Time: 02/06/21  9:29 AM   Result Value Ref Range    HIV AG/AB, 4th Gen NON-REACTIVE NON-REACTIVE   Comprehensive metabolic panel    Collection Time: 02/06/21  9:29 AM   Result Value Ref Range    Glucose, Random 89 65 - 99 mg/dL    BUN 15 7 - 25 mg/dL    Creatinine 0 86 0 60 - 1 35 mg/dL    eGFR Non  116 > OR = 60 mL/min/1 73m2    eGFR  134 > OR = 60 mL/min/1 73m2    SL AMB BUN/CREATININE RATIO NOT APPLICABLE 6 - 22 (calc)    Sodium 142 135 - 146 mmol/L    Potassium 4 4 3 5 - 5 3 mmol/L    Chloride 107 98 - 110 mmol/L    CO2 29 20 - 32 mmol/L    Calcium 9 6 8 6 - 10 3 mg/dL    Protein, Total 7 0 6 1 - 8 1 g/dL    Albumin 4 7 3 6 - 5 1 g/dL    Globulin 2 3 1 9 - 3 7 g/dL (calc)    Albumin/Globulin Ratio 2 0 1 0 - 2 5 (calc)    TOTAL BILIRUBIN 0 4 0 2 - 1 2 mg/dL    Alkaline Phosphatase 52 36 - 130 U/L    AST 21 10 - 40 U/L    ALT 43 9 - 46 U/L   CBC and differential    Collection Time: 02/06/21  9:29 AM   Result Value Ref Range    White Blood Cell Count 6 4 3 8 - 10 8 Thousand/uL    Red Blood Cell Count 5 30 4 20 - 5 80 Million/uL    Hemoglobin 16 7 13 2 - 17 1 g/dL    HCT 48 5 38 5 - 50 0 %    MCV 91 5 80 0 - 100 0 fL    MCH 31 5 27 0 - 33 0 pg    MCHC 34 4 32 0 - 36 0 g/dL    RDW 12 7 11 0 - 15 0 %    Platelet Count 366 715 - 400 Thousand/uL    SL AMB MPV 10 8 7 5 - 12 5 fL    Neutrophils (Absolute) 3,008 1,500 - 7,800 cells/uL    Lymphocytes (Absolute) 2,483 850 - 3,900 cells/uL    Monocytes (Absolute) 570 200 - 950 cells/uL    Eosinophils (Absolute) 250 15 - 500 cells/uL    Basophils ABS 90 0 - 200 cells/uL    Neutrophils 47 %    Lymphocytes 38 8 %    Monocytes 8 9 %    Eosinophils 3 9 %    Basophils PCT 1 4 %   TSH, 3rd generation    Collection Time: 02/06/21  9:29 AM   Result Value Ref Range    TSH 1 09 0 40 - 4 50 mIU/L

## 2021-02-12 NOTE — PATIENT INSTRUCTIONS
DISCUSSED HEALTH MAINTENENCE ISSUES  BW WILL BE REVIEWED  ENCOURAGED HEALTHY DIET AND EXERCISE    RV FOR ANNUAL HEALTH EXAM IN 1 YEAR  RV SOONER IF THERE ARE ANY CONCERNS    Recent Results (from the past 672 hour(s))   Lipid panel    Collection Time: 02/06/21  9:29 AM   Result Value Ref Range    Total Cholesterol 222 (H) <200 mg/dL    HDL 49 > OR = 40 mg/dL    Triglycerides 99 <150 mg/dL    LDL Calculated 152 (H) mg/dL (calc)    Chol HDLC Ratio 4 5 <5 0 (calc)    Non-HDL Cholesterol 173 (H) <130 mg/dL (calc)   Human Immunodeficiency Virus 1/2 Antigen / Antibody ( Fourth Generation) with Reflex Testing    Collection Time: 02/06/21  9:29 AM   Result Value Ref Range    HIV AG/AB, 4th Gen NON-REACTIVE NON-REACTIVE   Comprehensive metabolic panel    Collection Time: 02/06/21  9:29 AM   Result Value Ref Range    Glucose, Random 89 65 - 99 mg/dL    BUN 15 7 - 25 mg/dL    Creatinine 0 86 0 60 - 1 35 mg/dL    eGFR Non  116 > OR = 60 mL/min/1 73m2    eGFR  134 > OR = 60 mL/min/1 73m2    SL AMB BUN/CREATININE RATIO NOT APPLICABLE 6 - 22 (calc)    Sodium 142 135 - 146 mmol/L    Potassium 4 4 3 5 - 5 3 mmol/L    Chloride 107 98 - 110 mmol/L    CO2 29 20 - 32 mmol/L    Calcium 9 6 8 6 - 10 3 mg/dL    Protein, Total 7 0 6 1 - 8 1 g/dL    Albumin 4 7 3 6 - 5 1 g/dL    Globulin 2 3 1 9 - 3 7 g/dL (calc)    Albumin/Globulin Ratio 2 0 1 0 - 2 5 (calc)    TOTAL BILIRUBIN 0 4 0 2 - 1 2 mg/dL    Alkaline Phosphatase 52 36 - 130 U/L    AST 21 10 - 40 U/L    ALT 43 9 - 46 U/L   CBC and differential    Collection Time: 02/06/21  9:29 AM   Result Value Ref Range    White Blood Cell Count 6 4 3 8 - 10 8 Thousand/uL    Red Blood Cell Count 5 30 4 20 - 5 80 Million/uL    Hemoglobin 16 7 13 2 - 17 1 g/dL    HCT 48 5 38 5 - 50 0 %    MCV 91 5 80 0 - 100 0 fL    MCH 31 5 27 0 - 33 0 pg    MCHC 34 4 32 0 - 36 0 g/dL    RDW 12 7 11 0 - 15 0 %    Platelet Count 363 633 - 400 Thousand/uL    SL AMB MPV 10 8 7 5 - 12 5 fL Neutrophils (Absolute) 3,008 1,500 - 7,800 cells/uL    Lymphocytes (Absolute) 2,483 850 - 3,900 cells/uL    Monocytes (Absolute) 570 200 - 950 cells/uL    Eosinophils (Absolute) 250 15 - 500 cells/uL    Basophils ABS 90 0 - 200 cells/uL    Neutrophils 47 %    Lymphocytes 38 8 %    Monocytes 8 9 %    Eosinophils 3 9 %    Basophils PCT 1 4 %   TSH, 3rd generation    Collection Time: 02/06/21  9:29 AM   Result Value Ref Range    TSH 1 09 0 40 - 4 50 mIU/L

## 2021-02-12 NOTE — PROGRESS NOTES
BMI Counseling: There is no height or weight on file to calculate BMI  The BMI is above normal  Nutrition recommendations include encouraging healthy choices of fruits and vegetables and moderation in carbohydrate intake  Exercise recommendations include exercising 3-5 times per week  No pharmacotherapy was ordered  FAMILY PRACTICE HEALTH MAINTENANCE OFFICE VISIT  [facility] - [facility] [address] [facility] PHYS[facility]ANS    NAME: Alyse Hooks  AGE: 31 y o  SEX: male  : 1990     DATE: 2021    Assessment and Plan     Problem List Items Addressed This Visit        Other    Screening for hypothyroidism    Screening for hyperlipidemia    Screening for hypertension    Relevant Orders    POCT ECG    Routine general medical examination at a health care facility - Primary               Return in about 1 year (around 2022) for Annual physical         Chief Complaint   No chief complaint on file  History of Present Illness     DISCUSSED HEALTH ISSUES  REVIEWED MEDICAL RECORD  NO CONCERNS AT THIS TIME        Well Adult Physical   Patient here for a comprehensive physical exam       Diet and Physical Activity  Diet: well balanced diet  Weight concerns: Patient has class 1 obesity (BMI 30-34  9)  Exercise: infrequently      Depression Screen  PHQ-9 Depression Screening    PHQ-9:   Frequency of the following problems over the past two weeks:              General Health  Hearing: Normal:  bilateral  Vision: no vision problems  Dental: regular dental visits    Reproductive Health          The following portions of the patient's history were reviewed and updated as appropriate: allergies, current medications, past family history, past medical history, past social history, past surgical history and problem list     Review of Systems     Review of Systems   Constitutional: Negative for chills, fatigue and fever     HENT: Negative for congestion, ear discharge, ear pain, mouth sores, postnasal drip, sore throat and trouble swallowing  Eyes: Negative for pain, discharge and visual disturbance  Respiratory: Negative for cough, shortness of breath and wheezing  Cardiovascular: Negative for chest pain, palpitations and leg swelling  Gastrointestinal: Negative for abdominal distention, abdominal pain, blood in stool, diarrhea and nausea  Endocrine: Negative for polydipsia, polyphagia and polyuria  Genitourinary: Negative for dysuria, frequency, hematuria and urgency  Musculoskeletal: Negative for arthralgias, gait problem and joint swelling  Skin: Negative for pallor and rash  Neurological: Negative for dizziness, syncope, speech difficulty, weakness, light-headedness, numbness and headaches  Hematological: Negative for adenopathy  Psychiatric/Behavioral: Negative for behavioral problems, confusion and sleep disturbance  The patient is not nervous/anxious  Past Medical History     Past Medical History:   Diagnosis Date    Erb's palsy since birth   Marily Cedillo Erb's palsy     Hernia of abdominal wall 2018    Hypoglycemic disorder     Hypoglycemic disorder        Past Surgical History     Past Surgical History:   Procedure Laterality Date    HERNIA REPAIR         Social History     Social History     Socioeconomic History    Marital status: /Civil Union     Spouse name: None    Number of children: None    Years of education: None    Highest education level: None   Occupational History    None   Social Needs    Financial resource strain: None    Food insecurity     Worry: None     Inability: None    Transportation needs     Medical: None     Non-medical: None   Tobacco Use    Smoking status: Former Smoker     Packs/day: 0 50     Years: 6 00     Pack years: 3 00     Quit date: 2018     Years since quittin 2    Smokeless tobacco: Never Used   Substance and Sexual Activity    Alcohol use:  Yes     Alcohol/week: 4 0 standard drinks     Types: 4 Cans of beer per week Frequency: 2-4 times a month     Drinks per session: 3 or 4     Binge frequency: Less than monthly    Drug use: Yes     Types: Marijuana     Comment: OCCASSIONALY    Sexual activity: Yes     Partners: Female     Birth control/protection: None   Lifestyle    Physical activity     Days per week: None     Minutes per session: None    Stress: None   Relationships    Social connections     Talks on phone: None     Gets together: None     Attends [employer] service: None     Active member of club or organization: None     Attends meetings of clubs or organizations: None     Relationship status: None    Intimate partner violence     Fear of current or ex partner: None     Emotionally abused: None     Physically abused: None     Forced sexual activity: None   Other Topics Concern    None   Social History Narrative    None       Family History     Family History   Problem Relation Age of Onset    Diabetes Mother     Hypertension Mother     No Known Problems Father     No Known Problems Sister     No Known Problems Brother     Substance Abuse Neg Hx     Mental illness Neg Hx        Current Medications       Current Outpatient Medications:     albuterol (PROVENTIL HFA,VENTOLIN HFA) 90 mcg/act inhaler, Inhale 2 puffs every 6 (six) hours as needed for wheezing or shortness of breath, Disp: 1 Inhaler, Rfl: 5    CALCIUM-MAGNESIUM-ZINC PO, Take by mouth daily, Disp: , Rfl:     Cholecalciferol (Vitamin D3) 25 MCG (1000 UT) CAPS, Take by mouth daily, Disp: , Rfl:     ciprofloxacin-dexamethasone (CIPRODEX) otic suspension, Administer 4 drops to the right ear 2 (two) times a day for 7 days (Patient not taking: Reported on 12/2/2020), Disp: 7 5 mL, Rfl: 0     Allergies     Allergies   Allergen Reactions    Other      LIVE VACCINES - WILL GET THE DZ    Penicillin G Benzathine Anaphylaxis       Objective     There were no vitals taken for this visit       Physical Exam  Constitutional:       Appearance: He is well-developed  HENT:      Head: Normocephalic and atraumatic  Right Ear: External ear normal       Left Ear: External ear normal       Nose: Nose normal    Eyes:      General:         Right eye: No discharge  Left eye: No discharge  Conjunctiva/sclera: Conjunctivae normal       Pupils: Pupils are equal, round, and reactive to light  Neck:      Musculoskeletal: Neck supple  Thyroid: No thyromegaly  Vascular: No JVD  Cardiovascular:      Rate and Rhythm: Normal rate and regular rhythm  Heart sounds: Normal heart sounds  No murmur  Pulmonary:      Effort: Pulmonary effort is normal       Breath sounds: Normal breath sounds  No wheezing or rales  Abdominal:      General: Bowel sounds are normal       Palpations: Abdomen is soft  There is no mass  Tenderness: There is no abdominal tenderness  There is no guarding or rebound  Genitourinary:     Penis: Normal     Musculoskeletal: Normal range of motion  General: No tenderness or deformity  Lymphadenopathy:      Cervical: No cervical adenopathy  Skin:     General: Skin is warm and dry  Findings: No erythema or rash  Neurological:      Mental Status: He is alert and oriented to person, place, and time  Cranial Nerves: No cranial nerve deficit  Motor: No abnormal muscle tone  Coordination: Coordination normal       Deep Tendon Reflexes: Reflexes are normal and symmetric  Psychiatric:         Behavior: Behavior normal          Thought Content:  Thought content normal          Judgment: Judgment normal            No exam data present    Health Maintenance     Health Maintenance   Topic Date Due    BMI: Followup Plan  01/20/2021    Annual Physical  01/20/2021    Influenza Vaccine (1) 06/30/2021 (Originally 9/1/2020)    Depression Screening PHQ  12/02/2021    BMI: Adult  12/02/2021    DTaP,Tdap,and Td Vaccines (2 - Td) 01/20/2030    HIV Screening  Completed    Pneumococcal Vaccine: Pediatrics (0 to 5 Years) and At-Risk Patients (6 to 59 Years)  Aged Out    HIB Vaccine  Aged Out    Hepatitis B Vaccine  Aged Out    IPV Vaccine  Aged Out    Hepatitis A Vaccine  Aged Out    Meningococcal ACWY Vaccine  Aged Out    HPV Vaccine  Aged [employer] History   Administered Date(s) Administered    Tdap 01/20/2020       Sally Cross MD  [facility]

## 2023-12-28 ENCOUNTER — TELEPHONE (OUTPATIENT)
Age: 33
End: 2023-12-28

## 2023-12-28 DIAGNOSIS — Z00.00 ROUTINE GENERAL MEDICAL EXAMINATION AT A HEALTH CARE FACILITY: ICD-10-CM

## 2023-12-28 DIAGNOSIS — Z13.6 SCREENING FOR HYPERTENSION: ICD-10-CM

## 2023-12-28 DIAGNOSIS — Z13.29 SCREENING FOR HYPOTHYROIDISM: Primary | ICD-10-CM

## 2023-12-28 DIAGNOSIS — Z11.59 NEED FOR HEPATITIS C SCREENING TEST: ICD-10-CM

## 2023-12-28 DIAGNOSIS — Z13.220 SCREENING FOR HYPERLIPIDEMIA: ICD-10-CM

## 2023-12-28 NOTE — TELEPHONE ENCOUNTER
Result note changed into a Telephone Encounter.  Left message on machine patient to return call and notify that lab. Work is ready to be done before appointment.  This call will be handled by the [facility] when patient returns the call.  Alpesh Garcia LPN

## 2023-12-28 NOTE — TELEPHONE ENCOUNTER
Pt wife called requesting lab work for patients appt. Patient is scheduled for  1/22/23. Wife is requesting blood work before his apt. Would like a call back to patient once the lab work has been placed. Thank you!

## 2024-02-21 PROBLEM — Z13.220 SCREENING FOR HYPERLIPIDEMIA: Status: RESOLVED | Noted: 2020-01-12 | Resolved: 2024-02-21

## 2024-02-21 PROBLEM — Z13.6 SCREENING FOR HYPERTENSION: Status: RESOLVED | Noted: 2020-01-12 | Resolved: 2024-02-21

## 2024-02-21 PROBLEM — Z13.29 SCREENING FOR HYPOTHYROIDISM: Status: RESOLVED | Noted: 2020-01-12 | Resolved: 2024-02-21

## 2024-02-21 PROBLEM — Z00.00 ROUTINE GENERAL MEDICAL EXAMINATION AT A HEALTH CARE FACILITY: Status: RESOLVED | Noted: 2020-01-12 | Resolved: 2024-02-21

## 2024-02-21 LAB
BASOPHILS # BLD AUTO: 0.1 X10E3/UL (ref 0–0.2)
BASOPHILS NFR BLD AUTO: 1 %
EOSINOPHIL # BLD AUTO: 0.3 X10E3/UL (ref 0–0.4)
EOSINOPHIL NFR BLD AUTO: 3 %
ERYTHROCYTE [DISTWIDTH] IN BLOOD BY AUTOMATED COUNT: 12.6 % (ref 11.6–15.4)
HCT VFR BLD AUTO: 51.7 % (ref 37.5–51)
HGB BLD-MCNC: 17 G/DL (ref 13–17.7)
IMM GRANULOCYTES # BLD: 0 X10E3/UL (ref 0–0.1)
IMM GRANULOCYTES NFR BLD: 0 %
LYMPHOCYTES # BLD AUTO: 3 X10E3/UL (ref 0.7–3.1)
LYMPHOCYTES NFR BLD AUTO: 36 %
MCH RBC QN AUTO: 30.6 PG (ref 26.6–33)
MCHC RBC AUTO-ENTMCNC: 32.9 G/DL (ref 31.5–35.7)
MCV RBC AUTO: 93 FL (ref 79–97)
MONOCYTES # BLD AUTO: 0.7 X10E3/UL (ref 0.1–0.9)
MONOCYTES NFR BLD AUTO: 8 %
NEUTROPHILS # BLD AUTO: 4.2 X10E3/UL (ref 1.4–7)
NEUTROPHILS NFR BLD AUTO: 52 %
PLATELET # BLD AUTO: 253 X10E3/UL (ref 150–450)
RBC # BLD AUTO: 5.56 X10E6/UL (ref 4.14–5.8)
WBC # BLD AUTO: 8.2 X10E3/UL (ref 3.4–10.8)

## 2024-02-22 LAB
ALBUMIN SERPL-MCNC: 4.8 G/DL (ref 4.1–5.1)
ALBUMIN/GLOB SERPL: 2.1 {RATIO} (ref 1.2–2.2)
ALP SERPL-CCNC: 66 IU/L (ref 44–121)
ALT SERPL-CCNC: 44 IU/L (ref 0–44)
AST SERPL-CCNC: 23 IU/L (ref 0–40)
BILIRUB SERPL-MCNC: 0.5 MG/DL (ref 0–1.2)
BUN SERPL-MCNC: 13 MG/DL (ref 6–20)
BUN/CREAT SERPL: 13 (ref 9–20)
CALCIUM SERPL-MCNC: 9.5 MG/DL (ref 8.7–10.2)
CHLORIDE SERPL-SCNC: 101 MMOL/L (ref 96–106)
CHOLEST SERPL-MCNC: 216 MG/DL (ref 100–199)
CHOLEST/HDLC SERPL: 3.9 RATIO (ref 0–5)
CO2 SERPL-SCNC: 25 MMOL/L (ref 20–29)
CREAT SERPL-MCNC: 1.01 MG/DL (ref 0.76–1.27)
EGFR: 100 ML/MIN/1.73
GLOBULIN SER-MCNC: 2.3 G/DL (ref 1.5–4.5)
GLUCOSE SERPL-MCNC: 84 MG/DL (ref 70–99)
HCV AB S/CO SERPL IA: NON REACTIVE
HDLC SERPL-MCNC: 55 MG/DL
LDLC SERPL CALC-MCNC: 144 MG/DL (ref 0–99)
POTASSIUM SERPL-SCNC: 4.6 MMOL/L (ref 3.5–5.2)
PROT SERPL-MCNC: 7.1 G/DL (ref 6–8.5)
SL AMB VLDL CHOLESTEROL CALC: 17 MG/DL (ref 5–40)
SODIUM SERPL-SCNC: 140 MMOL/L (ref 134–144)
TRIGL SERPL-MCNC: 93 MG/DL (ref 0–149)
TSH SERPL DL<=0.005 MIU/L-ACNC: 1.5 UIU/ML (ref 0.45–4.5)

## 2024-02-26 ENCOUNTER — OFFICE VISIT (OUTPATIENT)
Dept: FAMILY MEDICINE CLINIC | Facility: CLINIC | Age: 34
End: 2024-02-26
Payer: COMMERCIAL

## 2024-02-26 VITALS
OXYGEN SATURATION: 97 % | SYSTOLIC BLOOD PRESSURE: 122 MMHG | HEIGHT: 69 IN | TEMPERATURE: 97.6 F | RESPIRATION RATE: 14 BRPM | HEART RATE: 60 BPM | DIASTOLIC BLOOD PRESSURE: 82 MMHG | BODY MASS INDEX: 31.7 KG/M2 | WEIGHT: 214 LBS

## 2024-02-26 DIAGNOSIS — Z13.29 SCREENING FOR HYPOTHYROIDISM: ICD-10-CM

## 2024-02-26 DIAGNOSIS — Z13.220 SCREENING FOR HYPERLIPIDEMIA: ICD-10-CM

## 2024-02-26 DIAGNOSIS — Z00.00 ROUTINE GENERAL MEDICAL EXAMINATION AT A HEALTH CARE FACILITY: Primary | ICD-10-CM

## 2024-02-26 DIAGNOSIS — B07.9 VIRAL WARTS, UNSPECIFIED TYPE: ICD-10-CM

## 2024-02-26 DIAGNOSIS — Z13.6 SCREENING FOR HYPERTENSION: ICD-10-CM

## 2024-02-26 PROCEDURE — 99395 PREV VISIT EST AGE 18-39: CPT | Performed by: FAMILY MEDICINE

## 2024-02-26 PROCEDURE — 17000 DESTRUCT PREMALG LESION: CPT | Performed by: FAMILY MEDICINE

## 2024-02-26 NOTE — PROGRESS NOTES
Depression Screening and Follow-up Plan: Patient was screened for depression during today's encounter. They screened negative with a PHQ-2 score of 0.    Tobacco Cessation Counseling: Tobacco cessation counseling was provided. The patient is sincerely urged to quit consumption of tobacco. He is not ready to quit tobacco.     FAMILY PRACTICE HEALTH MAINTENANCE OFFICE VISIT  [facility] - [facility] PHYSICIANS    NAME: Warren Dai  AGE: 33 y.o. SEX: male  : 1990     DATE: 2024    Assessment and Plan     Problem List Items Addressed This Visit    None  Visit Diagnoses     Routine general medical examination at a health care facility    -  Primary    Screening for hypertension        Screening for hyperlipidemia        Screening for hypothyroidism        Viral warts, unspecified type                   Return in about 1 year (around 2025) for Annual physical.        Chief Complaint     Chief Complaint   Patient presents with   • Annual Exam       History of Present Illness     DISCUSSED HEALTH ISSUES  REVIEWED MEDICAL RECORD  CONCERNS AT THIS TIME    WART        Well Adult Physical   Patient here for a comprehensive physical exam.      Diet and Physical Activity  Diet: well balanced diet  Weight concerns: Patient has class 1 obesity (BMI 30-34.9)  Exercise: infrequently      Depression Screen  PHQ-2/9 Depression Screening    Little interest or pleasure in doing things: 0 - not at all  Feeling down, depressed, or hopeless: 0 - not at all  PHQ-2 Score: 0  PHQ-2 Interpretation: Negative depression screen          General Health  Hearing: Normal:  bilateral  Vision: no vision problems  Dental: regular dental visits    Reproductive Health          The following portions of the patient's history were reviewed and updated as appropriate: allergies, current medications, past family history, past medical history, past social history, past surgical history and problem  list.    Review of Systems     Review of Systems   Constitutional:  Negative for chills, fatigue and fever.   HENT:  Negative for congestion, ear discharge, ear pain, mouth sores, postnasal drip, sore throat and trouble swallowing.    Eyes:  Negative for pain, discharge and visual disturbance.   Respiratory:  Negative for cough, shortness of breath and wheezing.    Cardiovascular:  Negative for chest pain, palpitations and leg swelling.   Gastrointestinal:  Negative for abdominal distention, abdominal pain, blood in stool, diarrhea and nausea.   Endocrine: Negative for polydipsia, polyphagia and polyuria.   Genitourinary:  Negative for dysuria, frequency, hematuria and urgency.   Musculoskeletal:  Negative for arthralgias, gait problem and joint swelling.   Skin:  Negative for pallor and rash.   Neurological:  Negative for dizziness, syncope, speech difficulty, weakness, light-headedness, numbness and headaches.   Hematological:  Negative for adenopathy.   Psychiatric/Behavioral:  Negative for behavioral problems, confusion and sleep disturbance. The patient is not nervous/anxious.        Past Medical History     Past Medical History:   Diagnosis Date   • Erb's palsy since birth   • Erb's palsy    • Hernia of abdominal wall 2018   • Hypoglycemic disorder    • Hypoglycemic disorder        Past Surgical History     Past Surgical History:   Procedure Laterality Date   • HERNIA REPAIR         Social History     Social History     Socioeconomic History   • Marital status: /Civil Union     Spouse name: None   • Number of children: None   • Years of education: None   • Highest education level: None   Occupational History   • None   Tobacco Use   • Smoking status: Some Days     Current packs/day: 0.00     Average packs/day: 0.5 packs/day for 6.0 years (3.0 ttl pk-yrs)     Types: Cigarettes     Start date: 2012     Last attempt to quit: 2018     Years since quittin.2   • Smokeless tobacco: Never   Vaping  "Use   • Vaping status: Never Used   Substance and Sexual Activity   • Alcohol use: Yes     Alcohol/week: 4.0 standard drinks of alcohol     Types: 4 Cans of beer per week   • Drug use: Yes     Types: Marijuana     Comment: OCCASSIONALY   • Sexual activity: Yes     Partners: Female     Birth control/protection: None   Other Topics Concern   • None   Social History Narrative   • None     Social Determinants of Health     Financial Resource Strain: Not on file   Food Insecurity: Not on file   Transportation Needs: Not on file   Physical Activity: Not on file   Stress: Not on file   Social Connections: Not on file   Intimate Partner Violence: Not on file   Housing Stability: Not on file       Family History     Family History   Problem Relation Age of Onset   • Diabetes Mother    • Hypertension Mother    • No Known Problems Father    • No Known Problems Sister    • No Known Problems Brother    • Substance Abuse Neg Hx    • Mental illness Neg Hx        Current Medications       Current Outpatient Medications:   •  b complex vitamins tablet, Take 1 tablet by mouth daily, Disp: , Rfl:   •  CALCIUM-MAGNESIUM-ZINC PO, Take by mouth daily, Disp: , Rfl:   •  Cholecalciferol (Vitamin D3) 25 MCG (1000 UT) CAPS, Take by mouth daily (Patient not taking: Reported on 2/26/2024), Disp: , Rfl:      Allergies     Allergies   Allergen Reactions   • Other      LIVE VACCINES - WILL GET THE DZ   • Penicillin G Benzathine Anaphylaxis       Objective     /82 (BP Location: Left arm, Patient Position: Sitting, Cuff Size: Large)   Pulse 60   Temp 97.6 °F (36.4 °C) (Temporal)   Resp 14   Ht 5' 8.5\" (1.74 m)   Wt 97.1 kg (214 lb)   SpO2 97%   BMI 32.07 kg/m²      Physical Exam  Constitutional:       General: He is not in acute distress.     Appearance: Normal appearance. He is well-developed. He is not ill-appearing.   HENT:      Head: Normocephalic and atraumatic.      Right Ear: Tympanic membrane and external ear normal.      Left " Ear: Tympanic membrane and external ear normal.      Nose: Nose normal.      Mouth/Throat:      Mouth: Mucous membranes are moist.   Eyes:      General:         Right eye: No discharge.         Left eye: No discharge.      Conjunctiva/sclera: Conjunctivae normal.      Pupils: Pupils are equal, round, and reactive to light.   Neck:      Thyroid: No thyromegaly.      Vascular: No JVD.   Cardiovascular:      Rate and Rhythm: Normal rate and regular rhythm.      Heart sounds: Normal heart sounds. No murmur heard.  Pulmonary:      Effort: Pulmonary effort is normal.      Breath sounds: Normal breath sounds. No wheezing or rales.   Abdominal:      General: Bowel sounds are normal.      Palpations: Abdomen is soft. There is no mass.      Tenderness: There is no abdominal tenderness. There is no guarding or rebound.   Genitourinary:     Penis: Normal.       Testes: Normal.   Musculoskeletal:         General: No tenderness or deformity. Normal range of motion.      Cervical back: Neck supple.   Lymphadenopathy:      Cervical: No cervical adenopathy.   Skin:     General: Skin is warm and dry.      Findings: Lesion present. No erythema or rash.      Comments: SMALL MOLLUSCUM LIKE WART R FOREARM   Neurological:      General: No focal deficit present.      Mental Status: He is alert and oriented to person, place, and time.      Cranial Nerves: No cranial nerve deficit.      Sensory: No sensory deficit.      Motor: No weakness or abnormal muscle tone.      Coordination: Coordination normal.      Gait: Gait normal.      Deep Tendon Reflexes: Reflexes are normal and symmetric. Reflexes normal.   Psychiatric:         Mood and Affect: Mood normal.         Behavior: Behavior normal.         Thought Content: Thought content normal.         Judgment: Judgment normal.           No results found.    Health Maintenance     Health Maintenance   Topic Date Due   • COVID-19 Vaccine (1 - 2023-24 season) Never done   • Influenza Vaccine (1)  06/30/2024 (Originally 9/1/2023)   • Depression Screening  02/26/2025   • Annual Physical  02/26/2025   • DTaP,Tdap,and Td Vaccines (2 - Td or Tdap) 01/20/2030   • Zoster Vaccine (1 of 2) 11/29/2039   • HIV Screening  Completed   • Hepatitis C Screening  Completed   • Pneumococcal Vaccine: Pediatrics (0 to 5 Years) and At-Risk Patients (6 to 64 Years)  Aged Out   • HIB Vaccine  Aged Out   • IPV Vaccine  Aged Out   • Hepatitis A Vaccine  Aged Out   • Meningococcal ACWY Vaccine  Aged Out   • HPV Vaccine  Aged Out     Immunization History   Administered Date(s) Administered   • Tdap 01/20/2020       Osiel Coles MD  [facility]

## 2024-02-26 NOTE — PATIENT INSTRUCTIONS
DISCUSSED HEALTH MAINTENENCE ISSUES  BW WILL BE REVIEWED  ENCOURAGED HEALTHY DIET AND EXERCISE    RV FOR ANNUAL HEALTH EXAM IN 1 YEAR  RV SOONER IF THERE ARE ANY CONCERNS      Recent Results (from the past 672 hour(s))   CBC and differential    Collection Time: 02/21/24  9:16 AM   Result Value Ref Range    White Blood Cell Count 8.2 3.4 - 10.8 x10E3/uL    Red Blood Cell Count 5.56 4.14 - 5.80 x10E6/uL    Hemoglobin 17.0 13.0 - 17.7 g/dL    HCT 51.7 (H) 37.5 - 51.0 %    MCV 93 79 - 97 fL    MCH 30.6 26.6 - 33.0 pg    MCHC 32.9 31.5 - 35.7 g/dL    RDW 12.6 11.6 - 15.4 %    Platelet Count 253 150 - 450 x10E3/uL    Neutrophils 52 Not Estab. %    Lymphocytes 36 Not Estab. %    Monocytes 8 Not Estab. %    Eosinophils 3 Not Estab. %    Basophils PCT 1 Not Estab. %    Neutrophils (Absolute) 4.2 1.4 - 7.0 x10E3/uL    Lymphocytes (Absolute) 3.0 0.7 - 3.1 x10E3/uL    Monocytes (Absolute) 0.7 0.1 - 0.9 x10E3/uL    Eosinophils (Absolute) 0.3 0.0 - 0.4 x10E3/uL    Basophils ABS 0.1 0.0 - 0.2 x10E3/uL    Immature Granulocytes 0 Not Estab. %    Immature Granulocytes (Absolute) 0.0 0.0 - 0.1 x10E3/uL   Comprehensive metabolic panel    Collection Time: 02/21/24  9:16 AM   Result Value Ref Range    Glucose, Random 84 70 - 99 mg/dL    BUN 13 6 - 20 mg/dL    Creatinine 1.01 0.76 - 1.27 mg/dL    eGFR 100 >59 mL/min/1.73    SL AMB BUN/CREATININE RATIO 13 9 - 20    Sodium 140 134 - 144 mmol/L    Potassium 4.6 3.5 - 5.2 mmol/L    Chloride 101 96 - 106 mmol/L    CO2 25 20 - 29 mmol/L    CALCIUM 9.5 8.7 - 10.2 mg/dL    Protein, Total 7.1 6.0 - 8.5 g/dL    Albumin 4.8 4.1 - 5.1 g/dL    Globulin, Total 2.3 1.5 - 4.5 g/dL    Albumin/Globulin Ratio 2.1 1.2 - 2.2    TOTAL BILIRUBIN 0.5 0.0 - 1.2 mg/dL    Alk Phos Isoenzymes 66 44 - 121 IU/L    AST 23 0 - 40 IU/L    ALT 44 0 - 44 IU/L   Hepatitis C antibody    Collection Time: 02/21/24  9:16 AM   Result Value Ref Range    HEP C AB Non Reactive Non Reactive   Lipid panel    Collection Time: 02/21/24   9:16 AM   Result Value Ref Range    Cholesterol, Total 216 (H) 100 - 199 mg/dL    Triglycerides 93 0 - 149 mg/dL    HDL 55 >39 mg/dL    VLDL Cholesterol Calculated 17 5 - 40 mg/dL    LDL Calculated 144 (H) 0 - 99 mg/dL    T. Chol/HDL Ratio 3.9 0.0 - 5.0 ratio   TSH, 3rd generation    Collection Time: 02/21/24  9:16 AM   Result Value Ref Range    TSH 1.500 0.450 - 4.500 uIU/mL

## 2024-02-26 NOTE — LETTER
[facility]      Current Outpatient Medications:     b complex vitamins tablet, Take 1 tablet by mouth daily, Disp: , Rfl:     CALCIUM-MAGNESIUM-ZINC PO, Take by mouth daily, Disp: , Rfl:     Cholecalciferol (Vitamin D3) 25 MCG (1000 UT) CAPS, Take by mouth daily, Disp: , Rfl:     Recent Results (from the past 672 hour(s))   CBC and differential    Collection Time: 02/21/24  9:16 AM   Result Value Ref Range    White Blood Cell Count 8.2 3.4 - 10.8 x10E3/uL    Red Blood Cell Count 5.56 4.14 - 5.80 x10E6/uL    Hemoglobin 17.0 13.0 - 17.7 g/dL    HCT 51.7 (H) 37.5 - 51.0 %    MCV 93 79 - 97 fL    MCH 30.6 26.6 - 33.0 pg    MCHC 32.9 31.5 - 35.7 g/dL    RDW 12.6 11.6 - 15.4 %    Platelet Count 253 150 - 450 x10E3/uL    Neutrophils 52 Not Estab. %    Lymphocytes 36 Not Estab. %    Monocytes 8 Not Estab. %    Eosinophils 3 Not Estab. %    Basophils PCT 1 Not Estab. %    Neutrophils (Absolute) 4.2 1.4 - 7.0 x10E3/uL    Lymphocytes (Absolute) 3.0 0.7 - 3.1 x10E3/uL    Monocytes (Absolute) 0.7 0.1 - 0.9 x10E3/uL    Eosinophils (Absolute) 0.3 0.0 - 0.4 x10E3/uL    Basophils ABS 0.1 0.0 - 0.2 x10E3/uL    Immature Granulocytes 0 Not Estab. %    Immature Granulocytes (Absolute) 0.0 0.0 - 0.1 x10E3/uL   Comprehensive metabolic panel    Collection Time: 02/21/24  9:16 AM   Result Value Ref Range    Glucose, Random 84 70 - 99 mg/dL    BUN 13 6 - 20 mg/dL    Creatinine 1.01 0.76 - 1.27 mg/dL    eGFR 100 >59 mL/min/1.73    SL AMB BUN/CREATININE RATIO 13 9 - 20    Sodium 140 134 - 144 mmol/L    Potassium 4.6 3.5 - 5.2 mmol/L    Chloride 101 96 - 106 mmol/L    CO2 25 20 - 29 mmol/L    CALCIUM 9.5 8.7 - 10.2 mg/dL    Protein, Total 7.1 6.0 - 8.5 g/dL    Albumin 4.8 4.1 - 5.1 g/dL    Globulin, Total 2.3 1.5 - 4.5 g/dL    Albumin/Globulin Ratio 2.1 1.2 - 2.2    TOTAL BILIRUBIN 0.5 0.0 - 1.2 mg/dL    Alk Phos Isoenzymes 66 44 - 121 IU/L    AST 23 0 - 40 IU/L    ALT 44 0 - 44 IU/L   Hepatitis C antibody    Collection Time: 02/21/24   9:16 AM   Result Value Ref Range    HEP C AB Non Reactive Non Reactive   Lipid panel    Collection Time: 02/21/24  9:16 AM   Result Value Ref Range    Cholesterol, Total 216 (H) 100 - 199 mg/dL    Triglycerides 93 0 - 149 mg/dL    HDL 55 >39 mg/dL    VLDL Cholesterol Calculated 17 5 - 40 mg/dL    LDL Calculated 144 (H) 0 - 99 mg/dL    T. Chol/HDL Ratio 3.9 0.0 - 5.0 ratio   TSH, 3rd generation    Collection Time: 02/21/24  9:16 AM   Result Value Ref Range    TSH 1.500 0.450 - 4.500 uIU/mL

## 2024-02-26 NOTE — PROGRESS NOTES
"Name: Warren Dai      : 1990      MRN: [id number]  Encounter Provider: Osiel Coles MD  Encounter Date: 2024   Encounter department: [facility]    Assessment & Plan    1. Routine general medical examination at a health care facility    2. Screening for hypertension    3. Screening for hyperlipidemia    4. Screening for hypothyroidism    5. Viral warts, unspecified type      Subjective    HPI     Objective    /82 (BP Location: Left arm, Patient Position: Sitting, Cuff Size: Large)   Pulse 60   Temp 97.6 °F (36.4 °C) (Temporal)   Resp 14   Ht 5' 8.5\" (1.74 m)   Wt 97.1 kg (214 lb)   SpO2 97%   BMI 32.07 kg/m²      Procedures    CRYO DESTRUCTION OF WART    PROCEDURE DETAILS WERE DISCUSSED AND VERBAL CONSENT WAS OBTAINED    AREA WAS PREPPED AND CRYO PERFORMED X 4    PATIENT TOLERATED PROCEDURE WELL  AND   Osiel Coles MD    "

## 2024-02-28 LAB
ANA SER QL: NEGATIVE
CRP SERPL-MCNC: <1 MG/L (ref 0–10)
RHEUMATOID FACT SERPL-ACNC: <10 IU/ML

## 2024-02-29 LAB — B BURGDOR IGG+IGM SER QL IA: NEGATIVE

## 2024-03-01 ENCOUNTER — TELEPHONE (OUTPATIENT)
Dept: FAMILY MEDICINE CLINIC | Facility: CLINIC | Age: 34
End: 2024-03-01

## 2024-03-01 NOTE — TELEPHONE ENCOUNTER
----- Message from Osiel Coles MD sent at 3/1/2024  8:42 AM EST -----  PLEASE CALL [facility]Y [facility] RESULTS    ALL SEEMED PRETTY GOOD  CHOL WAS MILDLY ELEVATED BUT THE GOOD CHOL WAS HIGH WHICH IS GOOD    TRY TO MODIFY INTAKE OF CHOL RICH FOODS SUCH AS RED MEATS AND WHOLE MILK DAIRY IF HE CAN    ALL THE RHEUMATOLOGIC PARAMETERS WERE OK    THANKS

## 2024-03-01 NOTE — TELEPHONE ENCOUNTER
Left message on machine for Warren to give his results per Dr Coles.  Please advise of his results per Dr Coles when Warren calls back.  Thanks

## 2024-03-04 NOTE — TELEPHONE ENCOUNTER
2nd time Left message on machine for Warren to give his results per Dr Coles.  Please advise of his results per Dr Coles when Warren calls back.  Thanks

## 2024-03-05 NOTE — TELEPHONE ENCOUNTER
Sent Warren a [employer] message.  No further action required      Ryan Kelley.  Please see message from Dr Coles with your blood work results.  Thanks      REVIEWED BW RESULTS     ALL SEEMED PRETTY GOOD   CHOL WAS MILDLY ELEVATED BUT THE GOOD CHOL WAS HIGH WHICH IS GOOD     TRY TO MODIFY INTAKE OF CHOL RICH FOODS SUCH AS RED MEATS AND WHOLE MILK DAIRY IF HE CAN     ALL THE RHEUMATOLOGIC PARAMETERS WERE OK     THANKS

## 2024-03-05 NOTE — TELEPHONE ENCOUNTER
3rd time Left message on machine for Warren to give his results per Dr Coles.  Please advise of his results per Dr Coles when Warren calls back.  Thanks

## 2025-01-21 DIAGNOSIS — Z00.00 ROUTINE GENERAL MEDICAL EXAMINATION AT A HEALTH CARE FACILITY: Primary | ICD-10-CM

## 2025-01-21 DIAGNOSIS — Z13.6 SCREENING FOR HYPERTENSION: ICD-10-CM

## 2025-01-21 DIAGNOSIS — Z13.29 SCREENING FOR HYPOTHYROIDISM: ICD-10-CM

## 2025-01-21 DIAGNOSIS — Z13.220 SCREENING FOR HYPERLIPIDEMIA: ICD-10-CM

## 2025-02-21 LAB
ALBUMIN SERPL-MCNC: 4.6 G/DL (ref 3.6–5.1)
ALBUMIN/GLOB SERPL: 2 (CALC) (ref 1–2.5)
ALP SERPL-CCNC: 57 U/L (ref 36–130)
ALT SERPL-CCNC: 26 U/L (ref 9–46)
AST SERPL-CCNC: 17 U/L (ref 10–40)
BASOPHILS # BLD AUTO: 61 CELLS/UL (ref 0–200)
BASOPHILS NFR BLD AUTO: 1 %
BILIRUB SERPL-MCNC: 0.7 MG/DL (ref 0.2–1.2)
BUN SERPL-MCNC: 11 MG/DL (ref 7–25)
BUN/CREAT SERPL: NORMAL (CALC) (ref 6–22)
CALCIUM SERPL-MCNC: 9.3 MG/DL (ref 8.6–10.3)
CHLORIDE SERPL-SCNC: 105 MMOL/L (ref 98–110)
CHOLEST SERPL-MCNC: 125 MG/DL
CHOLEST/HDLC SERPL: 3.3 (CALC)
CO2 SERPL-SCNC: 29 MMOL/L (ref 20–32)
CREAT SERPL-MCNC: 1 MG/DL (ref 0.6–1.26)
EOSINOPHIL # BLD AUTO: 201 CELLS/UL (ref 15–500)
EOSINOPHIL NFR BLD AUTO: 3.3 %
ERYTHROCYTE [DISTWIDTH] IN BLOOD BY AUTOMATED COUNT: 12.6 % (ref 11–15)
GFR/BSA.PRED SERPLBLD CYS-BASED-ARV: 101 ML/MIN/1.73M2
GLOBULIN SER CALC-MCNC: 2.3 G/DL (CALC) (ref 1.9–3.7)
GLUCOSE SERPL-MCNC: 87 MG/DL (ref 65–99)
HCT VFR BLD AUTO: 48.6 % (ref 38.5–50)
HDLC SERPL-MCNC: 38 MG/DL
HGB BLD-MCNC: 16.8 G/DL (ref 13.2–17.1)
LDLC SERPL CALC-MCNC: 71 MG/DL (CALC)
LYMPHOCYTES # BLD AUTO: 2544 CELLS/UL (ref 850–3900)
LYMPHOCYTES NFR BLD AUTO: 41.7 %
MCH RBC QN AUTO: 30.8 PG (ref 27–33)
MCHC RBC AUTO-ENTMCNC: 34.6 G/DL (ref 32–36)
MCV RBC AUTO: 89.2 FL (ref 80–100)
MONOCYTES # BLD AUTO: 482 CELLS/UL (ref 200–950)
MONOCYTES NFR BLD AUTO: 7.9 %
NEUTROPHILS # BLD AUTO: 2812 CELLS/UL (ref 1500–7800)
NEUTROPHILS NFR BLD AUTO: 46.1 %
NONHDLC SERPL-MCNC: 87 MG/DL (CALC)
PLATELET # BLD AUTO: 206 THOUSAND/UL (ref 140–400)
PMV BLD REES-ECKER: 11.3 FL (ref 7.5–12.5)
POTASSIUM SERPL-SCNC: 4.7 MMOL/L (ref 3.5–5.3)
PROT SERPL-MCNC: 6.9 G/DL (ref 6.1–8.1)
RBC # BLD AUTO: 5.45 MILLION/UL (ref 4.2–5.8)
SODIUM SERPL-SCNC: 142 MMOL/L (ref 135–146)
TRIGL SERPL-MCNC: 79 MG/DL
TSH SERPL-ACNC: 0.8 MIU/L (ref 0.4–4.5)
WBC # BLD AUTO: 6.1 THOUSAND/UL (ref 3.8–10.8)

## 2025-03-02 NOTE — PATIENT INSTRUCTIONS
DISCUSSED HEALTH MAINTENENCE ISSUES  BW WILL BE REVIEWED  ENCOURAGED HEALTHY DIET AND EXERCISE  COLONOSCOPY WILL BE ORDERED  RV FOR ANNUAL HEALTH EXAM IN 1 YEAR  RV SOONER IF THERE ARE ANY CONCERNS      Recent Results (from the past 4 weeks)   CBC and differential    Collection Time: 02/20/25  9:15 AM   Result Value Ref Range    White Blood Cell Count 6.1 3.8 - 10.8 Thousand/uL    Red Blood Cell Count 5.45 4.20 - 5.80 Million/uL    Hemoglobin 16.8 13.2 - 17.1 g/dL    HCT 48.6 38.5 - 50.0 %    MCV 89.2 80.0 - 100.0 fL    MCH 30.8 27.0 - 33.0 pg    MCHC 34.6 32.0 - 36.0 g/dL    RDW 12.6 11.0 - 15.0 %    Platelet Count 206 140 - 400 Thousand/uL    SL AMB MPV 11.3 7.5 - 12.5 fL    Neutrophils (Absolute) 2,812 1,500 - 7,800 cells/uL    Lymphocytes (Absolute) 2,544 850 - 3,900 cells/uL    Monocytes (Absolute) 482 200 - 950 cells/uL    Eosinophils (Absolute) 201 15 - 500 cells/uL    Basophils ABS 61 0 - 200 cells/uL    Neutrophils 46.1 %    Lymphocytes 41.7 %    Monocytes 7.9 %    Eosinophils 3.3 %    Basophils PCT 1.0 %   Comprehensive metabolic panel    Collection Time: 02/20/25  9:15 AM   Result Value Ref Range    Glucose, Random 87 65 - 99 mg/dL    BUN 11 7 - 25 mg/dL    Creatinine 1.00 0.60 - 1.26 mg/dL    eGFR 101 > OR = 60 mL/min/1.73m2    SL AMB BUN/CREATININE RATIO SEE NOTE: 6 - 22 (calc)    Sodium 142 135 - 146 mmol/L    Potassium 4.7 3.5 - 5.3 mmol/L    Chloride 105 98 - 110 mmol/L    CO2 29 20 - 32 mmol/L    Calcium 9.3 8.6 - 10.3 mg/dL    Protein, Total 6.9 6.1 - 8.1 g/dL    Albumin 4.6 3.6 - 5.1 g/dL    Globulin 2.3 1.9 - 3.7 g/dL (calc)    Albumin/Globulin Ratio 2.0 1.0 - 2.5 (calc)    TOTAL BILIRUBIN 0.7 0.2 - 1.2 mg/dL    Alkaline Phosphatase 57 36 - 130 U/L    AST 17 10 - 40 U/L    ALT 26 9 - 46 U/L   Lipid panel    Collection Time: 02/20/25  9:15 AM   Result Value Ref Range    Total Cholesterol 125 <200 mg/dL    HDL 38 (L) > OR = 40 mg/dL    Triglycerides 79 <150 mg/dL    LDL Calculated 71 mg/dL (calc)     Chol HDLC Ratio 3.3 <5.0 (calc)    Non-HDL Cholesterol 87 <130 mg/dL (calc)   TSH, 3rd generation    Collection Time: 02/20/25  9:15 AM   Result Value Ref Range    TSH 0.80 0.40 - 4.50 mIU/L

## 2025-03-03 ENCOUNTER — OFFICE VISIT (OUTPATIENT)
Dept: FAMILY MEDICINE CLINIC | Facility: CLINIC | Age: 35
End: 2025-03-03
Payer: COMMERCIAL

## 2025-03-03 VITALS
WEIGHT: 212 LBS | BODY MASS INDEX: 31.4 KG/M2 | OXYGEN SATURATION: 97 % | HEIGHT: 69 IN | TEMPERATURE: 98.1 F | SYSTOLIC BLOOD PRESSURE: 120 MMHG | HEART RATE: 87 BPM | DIASTOLIC BLOOD PRESSURE: 80 MMHG | RESPIRATION RATE: 18 BRPM

## 2025-03-03 DIAGNOSIS — Z13.220 SCREENING FOR HYPERLIPIDEMIA: ICD-10-CM

## 2025-03-03 DIAGNOSIS — Z00.00 ROUTINE GENERAL MEDICAL EXAMINATION AT A HEALTH CARE FACILITY: Primary | ICD-10-CM

## 2025-03-03 DIAGNOSIS — Z13.29 SCREENING FOR HYPOTHYROIDISM: ICD-10-CM

## 2025-03-03 DIAGNOSIS — Z13.6 SCREENING FOR HYPERTENSION: ICD-10-CM

## 2025-03-03 PROCEDURE — 93000 ELECTROCARDIOGRAM COMPLETE: CPT | Performed by: FAMILY MEDICINE

## 2025-03-03 PROCEDURE — 99395 PREV VISIT EST AGE 18-39: CPT | Performed by: FAMILY MEDICINE

## 2025-03-03 NOTE — LETTER
[facility]      Current Outpatient Medications:     b complex vitamins tablet, Take 1 tablet by mouth daily, Disp: , Rfl:     CALCIUM-MAGNESIUM-ZINC PO, Take by mouth daily, Disp: , Rfl:     Cholecalciferol (Vitamin D3) 25 MCG (1000 UT) CAPS, Take by mouth daily (Patient not taking: Reported on 2/26/2024), Disp: , Rfl:     Recent Results (from the past 4 weeks)   CBC and differential    Collection Time: 02/20/25  9:15 AM   Result Value Ref Range    White Blood Cell Count 6.1 3.8 - 10.8 Thousand/uL    Red Blood Cell Count 5.45 4.20 - 5.80 Million/uL    Hemoglobin 16.8 13.2 - 17.1 g/dL    HCT 48.6 38.5 - 50.0 %    MCV 89.2 80.0 - 100.0 fL    MCH 30.8 27.0 - 33.0 pg    MCHC 34.6 32.0 - 36.0 g/dL    RDW 12.6 11.0 - 15.0 %    Platelet Count 206 140 - 400 Thousand/uL    SL AMB MPV 11.3 7.5 - 12.5 fL    Neutrophils (Absolute) 2,812 1,500 - 7,800 cells/uL    Lymphocytes (Absolute) 2,544 850 - 3,900 cells/uL    Monocytes (Absolute) 482 200 - 950 cells/uL    Eosinophils (Absolute) 201 15 - 500 cells/uL    Basophils ABS 61 0 - 200 cells/uL    Neutrophils 46.1 %    Lymphocytes 41.7 %    Monocytes 7.9 %    Eosinophils 3.3 %    Basophils PCT 1.0 %   Comprehensive metabolic panel    Collection Time: 02/20/25  9:15 AM   Result Value Ref Range    Glucose, Random 87 65 - 99 mg/dL    BUN 11 7 - 25 mg/dL    Creatinine 1.00 0.60 - 1.26 mg/dL    eGFR 101 > OR = 60 mL/min/1.73m2    SL AMB BUN/CREATININE RATIO SEE NOTE: 6 - 22 (calc)    Sodium 142 135 - 146 mmol/L    Potassium 4.7 3.5 - 5.3 mmol/L    Chloride 105 98 - 110 mmol/L    CO2 29 20 - 32 mmol/L    Calcium 9.3 8.6 - 10.3 mg/dL    Protein, Total 6.9 6.1 - 8.1 g/dL    Albumin 4.6 3.6 - 5.1 g/dL    Globulin 2.3 1.9 - 3.7 g/dL (calc)    Albumin/Globulin Ratio 2.0 1.0 - 2.5 (calc)    TOTAL BILIRUBIN 0.7 0.2 - 1.2 mg/dL    Alkaline Phosphatase 57 36 - 130 U/L    AST 17 10 - 40 U/L    ALT 26 9 - 46 U/L   Lipid panel    Collection Time: 02/20/25  9:15 AM   Result Value Ref Range     Total Cholesterol 125 <200 mg/dL    HDL 38 (L) > OR = 40 mg/dL    Triglycerides 79 <150 mg/dL    LDL Calculated 71 mg/dL (calc)    Chol HDLC Ratio 3.3 <5.0 (calc)    Non-HDL Cholesterol 87 <130 mg/dL (calc)   TSH, 3rd generation    Collection Time: 02/20/25  9:15 AM   Result Value Ref Range    TSH 0.80 0.40 - 4.50 mIU/L

## 2025-03-03 NOTE — PROGRESS NOTES
Depression Screening and Follow-up Plan: Patient was screened for depression during today's encounter. They screened negative with a PHQ-2 score of 0.      Tobacco Cessation Counseling: Tobacco cessation counseling was provided. The patient is sincerely urged to quit consumption of tobacco. He is not ready to quit tobacco.     FAMILY PRACTICE HEALTH MAINTENANCE OFFICE VISIT  [facility] - [facility]    NAME: Warren Dai  AGE: 34 y.o. SEX: male  : 1990     DATE: 3/3/2025    Assessment and Plan     Problem List Items Addressed This Visit    None  Visit Diagnoses       Routine general medical examination at a health care facility    -  Primary      Screening for hypertension        Relevant Orders    POCT ECG (Completed)      Screening for hyperlipidemia          Screening for hypothyroidism                   Return in about 1 year (around 3/3/2026) for Annual physical.        Chief Complaint     Chief Complaint   Patient presents with   • Physical Exam     Cecille/CHAPITO       History of Present Illness     DISCUSSED HEALTH ISSUES  REVIEWED MEDICAL RECORD  NO CONCERNS AT THIS TIME            Well Adult Physical   Patient here for a comprehensive physical exam.      Diet and Physical Activity  Diet: well balanced diet  Weight concerns: Patient has class 1 obesity (BMI 30-34.9)  Exercise: infrequently      Depression Screen  PHQ-2/9 Depression Screening    Little interest or pleasure in doing things: 0 - not at all  Feeling down, depressed, or hopeless: 0 - not at all  PHQ-2 Score: 0  PHQ-2 Interpretation: Negative depression screen          General Health  Hearing: Normal:  bilateral  Vision: no vision problems  Dental: regular dental visits    Reproductive Health          The following portions of the patient's history were reviewed and updated as appropriate: allergies, current medications, past family history, past medical history, past social history, past surgical  history and problem list.    Review of Systems     Review of Systems   Constitutional:  Negative for chills, fatigue and fever.   HENT:  Negative for congestion, ear discharge, ear pain, mouth sores, postnasal drip, sore throat and trouble swallowing.    Eyes:  Negative for pain, discharge and visual disturbance.   Respiratory:  Negative for cough, shortness of breath and wheezing.    Cardiovascular:  Negative for chest pain, palpitations and leg swelling.   Gastrointestinal:  Negative for abdominal distention, abdominal pain, blood in stool, diarrhea and nausea.   Endocrine: Negative for polydipsia, polyphagia and polyuria.   Genitourinary:  Negative for dysuria, frequency, hematuria and urgency.   Musculoskeletal:  Negative for arthralgias, gait problem and joint swelling.   Skin:  Negative for pallor and rash.   Neurological:  Negative for dizziness, syncope, speech difficulty, weakness, light-headedness, numbness and headaches.   Hematological:  Negative for adenopathy.   Psychiatric/Behavioral:  Negative for behavioral problems, confusion and sleep disturbance. The patient is not nervous/anxious.        Past Medical History     Past Medical History:   Diagnosis Date   • Erb's palsy since birth   • Erb's palsy    • Hernia of abdominal wall 2018   • Hypoglycemic disorder    • Hypoglycemic disorder        Past Surgical History     Past Surgical History:   Procedure Laterality Date   • HERNIA REPAIR         Social History     Social History     Socioeconomic History   • Marital status: /Civil Union     Spouse name: None   • Number of children: None   • Years of education: None   • Highest education level: None   Occupational History   • None   Tobacco Use   • Smoking status: Some Days     Current packs/day: 0.00     Average packs/day: 0.5 packs/day for 6.0 years (3.0 ttl pk-yrs)     Types: Cigarettes     Start date: 2012     Last attempt to quit: 2018     Years since quittin.2     Passive  "exposure: Never   • Smokeless tobacco: Never   Vaping Use   • Vaping status: Never Used   Substance and Sexual Activity   • Alcohol use: Yes     Alcohol/week: 4.0 standard drinks of alcohol     Types: 4 Cans of beer per week   • Drug use: Yes     Types: Marijuana     Comment: OCCASSIONALY   • Sexual activity: Yes     Partners: Female     Birth control/protection: None   Other Topics Concern   • None   Social History Narrative   • None     Social Drivers of Health     Financial Resource Strain: Not on file   Food Insecurity: Not on file   Transportation Needs: Not on file   Physical Activity: Not on file   Stress: Not on file   Social Connections: Not on file   Intimate Partner Violence: Not on file   Housing Stability: Not on file       Family History     Family History   Problem Relation Age of Onset   • Diabetes Mother    • Hypertension Mother    • No Known Problems Father    • No Known Problems Sister    • No Known Problems Brother    • Substance Abuse Neg Hx    • Mental illness Neg Hx        Current Medications       Current Outpatient Medications:   •  b complex vitamins tablet, Take 1 tablet by mouth daily, Disp: , Rfl:   •  CALCIUM-MAGNESIUM-ZINC PO, Take by mouth daily, Disp: , Rfl:      Allergies     Allergies   Allergen Reactions   • Other      LIVE VACCINES - WILL GET THE DZ   • Penicillin G Benzathine Anaphylaxis       Objective     /80   Pulse 87   Temp 98.1 °F (36.7 °C)   Resp 18   Ht 5' 8.5\" (1.74 m)   Wt 96.2 kg (212 lb)   SpO2 97%   BMI 31.77 kg/m²      Physical Exam  Constitutional:       General: He is not in acute distress.     Appearance: Normal appearance. He is well-developed. He is obese. He is not ill-appearing.   HENT:      Head: Normocephalic and atraumatic.      Right Ear: Tympanic membrane and external ear normal.      Left Ear: Tympanic membrane and external ear normal.      Nose: Nose normal.      Mouth/Throat:      Mouth: Mucous membranes are moist.   Eyes:      General:  "        Right eye: No discharge.         Left eye: No discharge.      Conjunctiva/sclera: Conjunctivae normal.      Pupils: Pupils are equal, round, and reactive to light.   Neck:      Thyroid: No thyromegaly.      Vascular: No JVD.   Cardiovascular:      Rate and Rhythm: Normal rate and regular rhythm.      Heart sounds: Normal heart sounds. No murmur heard.  Pulmonary:      Effort: Pulmonary effort is normal.      Breath sounds: Normal breath sounds. No wheezing or rales.   Abdominal:      General: Bowel sounds are normal.      Palpations: Abdomen is soft. There is no mass.      Tenderness: There is no abdominal tenderness. There is no guarding or rebound.   Genitourinary:     Penis: Normal.       Testes: Normal.      Prostate: Normal.      Rectum: Normal. Guaiac result negative.   Musculoskeletal:         General: No tenderness or deformity. Normal range of motion.      Cervical back: Neck supple.   Lymphadenopathy:      Cervical: No cervical adenopathy.   Skin:     General: Skin is warm and dry.      Findings: No erythema or rash.   Neurological:      General: No focal deficit present.      Mental Status: He is alert and oriented to person, place, and time.      Cranial Nerves: No cranial nerve deficit.      Sensory: No sensory deficit.      Motor: No weakness or abnormal muscle tone.      Coordination: Coordination normal.      Gait: Gait normal.      Deep Tendon Reflexes: Reflexes are normal and symmetric. Reflexes normal.   Psychiatric:         Mood and Affect: Mood normal.         Behavior: Behavior normal.         Thought Content: Thought content normal.         Judgment: Judgment normal.           No results found.    Health Maintenance     Health Maintenance   Topic Date Due   • Pneumococcal Vaccine: Pediatrics (0 to 5 Years) and At-Risk Patients (6 to 64 Years) (1 of 2 - PCV) Never done   • Influenza Vaccine (1) Never done   • COVID-19 Vaccine (1 - 2024-25 season) Never done   • Annual Physical   02/26/2025   • Depression Screening  03/03/2026   • DTaP,Tdap,and Td Vaccines (2 - Td or Tdap) 01/20/2030   • Zoster Vaccine (1 of 2) 11/29/2039   • HIV Screening  Completed   • Hepatitis C Screening  Completed   • Meningococcal B Vaccine  Aged Out   • RSV Vaccine age 0-20 Months  Aged Out   • HIB Vaccine  Aged Out   • IPV Vaccine  Aged Out   • Hepatitis A Vaccine  Aged Out   • Meningococcal ACWY Vaccine  Aged Out   • HPV Vaccine  Aged Out     Immunization History   Administered Date(s) Administered   • Tdap 01/20/2020       Osiel Coles MD  [facility]